# Patient Record
Sex: FEMALE | Race: WHITE | NOT HISPANIC OR LATINO
[De-identification: names, ages, dates, MRNs, and addresses within clinical notes are randomized per-mention and may not be internally consistent; named-entity substitution may affect disease eponyms.]

---

## 2019-06-12 ENCOUNTER — APPOINTMENT (OUTPATIENT)
Dept: ENDOCRINOLOGY | Facility: CLINIC | Age: 49
End: 2019-06-12
Payer: COMMERCIAL

## 2019-06-12 VITALS
HEART RATE: 88 BPM | RESPIRATION RATE: 17 BRPM | HEIGHT: 67 IN | WEIGHT: 241 LBS | BODY MASS INDEX: 37.83 KG/M2 | SYSTOLIC BLOOD PRESSURE: 142 MMHG | DIASTOLIC BLOOD PRESSURE: 90 MMHG | OXYGEN SATURATION: 95 %

## 2019-06-12 PROCEDURE — 99204 OFFICE O/P NEW MOD 45 MIN: CPT | Mod: 25

## 2019-06-12 PROCEDURE — 36415 COLL VENOUS BLD VENIPUNCTURE: CPT

## 2019-06-12 NOTE — HISTORY OF PRESENT ILLNESS
[FreeTextEntry1] : 48 year female  referred for hypothyroidism management. \par Ms. SHULTZ  was diagnosed with hypothyroidism at the age of 30. \par Previously under care of Dr. Alvarado.\par Most recently on  112 mcg of generic LT4. \par She gained about 70 lbs with her pregnancy about 6 years ago, was able to lose 30 lbs after delivery, and slowly regained another 20 lbs. She tries to eat 3 times a day, but is not watching calories for most of the time, frequently eating large carb/calorie dinner.\par She is very sedentary, putting typically no more than 1000 steps a day.\par She tried WW twice in her twenty's which helped at that time. \par Denies family history of thyroid cancer or history of radiation exposure to head and neck area in a childhood.\par No labs available for me to review.\par

## 2019-06-12 NOTE — CONSULT LETTER
[Dear  ___] : Dear  [unfilled], [Courtesy Letter:] : I had the pleasure of seeing your patient, [unfilled], in my office today. [Sincerely,] : Sincerely, [FreeTextEntry1] : Thank you for referring  Ms. THERON SHULTZ to me for evaluation and treatment. Please, see attached consultation note. As always, if there are specific questions you would like to discuss, please feel free to contact me.\par Thank you for the courtesy of this evaluation.\par  [FreeTextEntry3] : Eh Slaughter MD, FACE, ECNU\par

## 2019-06-12 NOTE — ASSESSMENT
[Levothyroxine] : The patient was instructed to take Levothyroxine on an empty stomach, separate from vitamins, and wait at least 30 minutes before eating [FreeTextEntry1] : - Current approaches to weight management are discussed with the patient. \par Suggested extensive nutritional education program. Proper dietary restrictions (including intermittent fasting and ketogenic diets and exercise routines discussed. Medical weight loss therapies were reviewed with the patient. \par Advised resuming weight watchers. Bariatric surgical options reviewed, but patient is not interested at this moment.\par - check CMP, a1c, thyroid panel\par - Thyroid US\par - Proper intake of levothyroxine is reviewed in details, including on an empty stomach, with water only, at least one hour before taking any medications, vitamins, or supplements and three-four hours before taking iron or calcium.\par RTC 2 weeks

## 2019-06-13 ENCOUNTER — TRANSCRIPTION ENCOUNTER (OUTPATIENT)
Age: 49
End: 2019-06-13

## 2019-06-13 LAB
ALBUMIN SERPL ELPH-MCNC: 4.3 G/DL
ALP BLD-CCNC: 56 U/L
ALT SERPL-CCNC: 18 U/L
ANION GAP SERPL CALC-SCNC: 10 MMOL/L
AST SERPL-CCNC: 20 U/L
BASOPHILS # BLD AUTO: 0.04 K/UL
BASOPHILS NFR BLD AUTO: 0.4 %
BILIRUB SERPL-MCNC: 0.3 MG/DL
BUN SERPL-MCNC: 15 MG/DL
CALCIUM SERPL-MCNC: 9.1 MG/DL
CHLORIDE SERPL-SCNC: 103 MMOL/L
CO2 SERPL-SCNC: 26 MMOL/L
CREAT SERPL-MCNC: 0.64 MG/DL
EOSINOPHIL # BLD AUTO: 0.3 K/UL
EOSINOPHIL NFR BLD AUTO: 3.3 %
ESTIMATED AVERAGE GLUCOSE: 117 MG/DL
GLUCOSE SERPL-MCNC: 123 MG/DL
HBA1C MFR BLD HPLC: 5.7 %
HCT VFR BLD CALC: 32.5 %
HGB BLD-MCNC: 9.4 G/DL
IMM GRANULOCYTES NFR BLD AUTO: 0.3 %
LYMPHOCYTES # BLD AUTO: 2.39 K/UL
LYMPHOCYTES NFR BLD AUTO: 26.3 %
MAN DIFF?: NORMAL
MCHC RBC-ENTMCNC: 23.9 PG
MCHC RBC-ENTMCNC: 28.9 GM/DL
MCV RBC AUTO: 82.5 FL
MONOCYTES # BLD AUTO: 0.68 K/UL
MONOCYTES NFR BLD AUTO: 7.5 %
NEUTROPHILS # BLD AUTO: 5.66 K/UL
NEUTROPHILS NFR BLD AUTO: 62.2 %
PLATELET # BLD AUTO: 496 K/UL
POTASSIUM SERPL-SCNC: 4 MMOL/L
PROT SERPL-MCNC: 7.4 G/DL
RBC # BLD: 3.94 M/UL
RBC # FLD: 17 %
SODIUM SERPL-SCNC: 139 MMOL/L
T4 FREE SERPL-MCNC: 1.3 NG/DL
THYROGLOB AB SERPL-ACNC: <20 IU/ML
THYROPEROXIDASE AB SERPL IA-ACNC: 171 IU/ML
TSH SERPL-ACNC: 1.6 UIU/ML
WBC # FLD AUTO: 9.1 K/UL

## 2019-07-09 ENCOUNTER — APPOINTMENT (OUTPATIENT)
Dept: ENDOCRINOLOGY | Facility: CLINIC | Age: 49
End: 2019-07-09
Payer: COMMERCIAL

## 2019-07-09 ENCOUNTER — APPOINTMENT (OUTPATIENT)
Dept: GASTROENTEROLOGY | Facility: CLINIC | Age: 49
End: 2019-07-09
Payer: COMMERCIAL

## 2019-07-09 VITALS
BODY MASS INDEX: 37.83 KG/M2 | DIASTOLIC BLOOD PRESSURE: 80 MMHG | HEIGHT: 67 IN | WEIGHT: 241 LBS | HEART RATE: 90 BPM | SYSTOLIC BLOOD PRESSURE: 126 MMHG | RESPIRATION RATE: 17 BRPM | OXYGEN SATURATION: 99 %

## 2019-07-09 VITALS
SYSTOLIC BLOOD PRESSURE: 120 MMHG | DIASTOLIC BLOOD PRESSURE: 70 MMHG | RESPIRATION RATE: 16 BRPM | TEMPERATURE: 98.2 F | BODY MASS INDEX: 37.83 KG/M2 | HEIGHT: 67 IN | WEIGHT: 241 LBS | OXYGEN SATURATION: 98 % | HEART RATE: 90 BPM

## 2019-07-09 DIAGNOSIS — Z83.71 FAMILY HISTORY OF COLONIC POLYPS: ICD-10-CM

## 2019-07-09 DIAGNOSIS — K92.1 MELENA: ICD-10-CM

## 2019-07-09 DIAGNOSIS — Z80.0 FAMILY HISTORY OF MALIGNANT NEOPLASM OF DIGESTIVE ORGANS: ICD-10-CM

## 2019-07-09 DIAGNOSIS — Z12.11 ENCOUNTER FOR SCREENING FOR MALIGNANT NEOPLASM OF COLON: ICD-10-CM

## 2019-07-09 PROCEDURE — 99205 OFFICE O/P NEW HI 60 MIN: CPT

## 2019-07-09 PROCEDURE — 99213 OFFICE O/P EST LOW 20 MIN: CPT

## 2019-07-09 NOTE — ASSESSMENT
[Levothyroxine] : The patient was instructed to take Levothyroxine on an empty stomach, separate from vitamins, and wait at least 30 minutes before eating [FreeTextEntry1] : - Current approaches to weight management are discussed with the patient. \par Suggested extensive nutritional education program. Proper dietary restrictions (including intermittent fasting and ketogenic diets and exercise routines discussed. Medical weight loss therapies were reviewed with the patient. \par Advised resuming weight watchers. Bariatric surgical options reviewed, but patient is not interested at this moment.\par - discussed retrying metformin, but patient wants to improve diet and exercises for now\par - Thyroid US\par - continue L-thyroxine 112mcg\par - Proper intake of levothyroxine is reviewed in details, including on an empty stomach, with water only, at least one hour before taking any medications, vitamins, or supplements and three-four hours before taking iron or calcium.\par - advised to f/u with gyn re: heavy bleeding, and if no change in anemia will see hem/onc\par RTC 3 months [Carbohydrate Consistent Diet] : carbohydrate consistent diet

## 2019-07-09 NOTE — HISTORY OF PRESENT ILLNESS
[FreeTextEntry1] : Office consultation on 19.\par \par Patient is a 48-year-old woman who presents for consultation with complaint of rectal bleeding. PMD: Dr. Lucila Ness.\par \par Patient has a long-standing history of intermittent hematochezia that she has attributed to hemorrhoids. Primarily describes staining the underwear and scant rectal bleeding when wiping. Never observed blood in her stool. Currently reports having one formed bowel movement daily without any complaints of diarrhea, constipation or change in bowel habit. As noted, does not observe blood in stool.\par \par Patient reports history of hemorrhoids initially during a pregnancy in . Delivered by  section on 10/11/12. Throughout pregnancy experienced anal irritation and observed intermittent spotting of red blood on her underwear. Ever since pregnancy patient has experienced intermittent anal irritation described as itching and discomfort. As noted, observed scant blood in underwear. Denies observing any blood dripping from the anal canal after defecation. Denies any chronic severe anal pain or any sense of tearing or cutting pain.\par \par Appetite is excellent. Reports progressive weight increase since pregnancy. Reports 10 pound weight increase over the past year. Denies any complaints of dysphagia, heartburn, nausea, vomiting or abdominal pain.\par \par Reports no past history of digestive illness except as noted. Patient never had a colonoscopy. Mother had colon polyps. Maternal grandfather had colon cancer in his 70s.

## 2019-07-09 NOTE — ASSESSMENT
[FreeTextEntry1] : Impression:\par \par #1 hematochezia-anal canal etiology suspected. Of note, scant perianal fecal soiling noted. Current symptoms of anal irritation and itching likely related to irritating effects from this. No significant hemorrhoids observed but possibility of internal hemorrhoids contributing to symptoms considered. Scant hematochezia likely related to irritation from excessive wiping. Evaluate for any alternate etiology of hematochezia including any possibility of colonic neoplastic process.\par \par #2 colon cancer screening-rule out colonic neoplasm. Reports mother had colonic polyps and maternal grandfather had colon cancer in his 70s. This may pose some increased risk of colorectal neoplasia.\par \par #3 obesity.\par \par #4 hypertension\par \par #5 pre-diabetes.\par \par #6 hypothyroid.\par \par #7 status post  section 10/2012.

## 2019-07-09 NOTE — PHYSICAL EXAM
[General Appearance - Alert] : alert [General Appearance - In No Acute Distress] : in no acute distress [General Appearance - Well Developed] : well developed [General Appearance - Well-Appearing] : healthy appearing [Sclera] : the sclera and conjunctiva were normal [Oropharynx] : the oropharynx was normal [Neck Appearance] : the appearance of the neck was normal [Neck Cervical Mass (___cm)] : no neck mass was observed [Respiration, Rhythm And Depth] : normal respiratory rhythm and effort [Exaggerated Use Of Accessory Muscles For Inspiration] : no accessory muscle use [Auscultation Breath Sounds / Voice Sounds] : lungs were clear to auscultation bilaterally [Heart Rate And Rhythm] : heart rate was normal and rhythm regular [Heart Sounds] : normal S1 and S2 [Heart Sounds Gallop] : no gallops [Murmurs] : no murmurs [Heart Sounds Pericardial Friction Rub] : no pericardial rub [Edema] : there was no peripheral edema [Bowel Sounds] : normal bowel sounds [Abdomen Soft] : soft [Abdomen Tenderness] : non-tender [] : no hepato-splenomegaly [Abdomen Mass (___ Cm)] : no abdominal mass palpated [Abdomen Hernia] : no hernia was discovered [Normal Sphincter Tone] : normal sphincter tone [No Rectal Mass] : no rectal mass [Cervical Lymph Nodes Enlarged Posterior Bilaterally] : posterior cervical [Supraclavicular Lymph Nodes Enlarged Bilaterally] : supraclavicular [Cervical Lymph Nodes Enlarged Anterior Bilaterally] : anterior cervical [No CVA Tenderness] : no ~M costovertebral angle tenderness [No Spinal Tenderness] : no spinal tenderness [Abnormal Walk] : normal gait [Nail Clubbing] : no clubbing  or cyanosis of the fingernails [Skin Color & Pigmentation] : normal skin color and pigmentation [Oriented To Time, Place, And Person] : oriented to person, place, and time [Impaired Insight] : insight and judgment were intact [Affect] : the affect was normal [Mood] : the mood was normal [FreeTextEntry1] : The anus is symmetric and normal appearing. There was no perianal tenderness, mass, fluctuance, drainage or fistulous opening. There was no evidence of a fissure. Significant prolapsing hemorrhoids were not observed. There was no rectal mass. Soft brown stool without blood was noted. Scant perianal soiling with residual fecal material was noted. Blood not observed.

## 2019-07-09 NOTE — HISTORY OF PRESENT ILLNESS
[FreeTextEntry1] : 48 year female  f/u for hypothyroidism management. \par TSH- 1.6, FT4- 1.3\par + TPO ab (171), neg tg ab\par did not do thyr US yet\par started diet 3 days ago. Lost 2 lbs so far.\par \par HPI:\par Ms. SHULTZ  was diagnosed with hypothyroidism at the age of 30. \par Previously under care of Dr. Alvarado.\par Most recently on  112 mcg of generic LT4. \par She gained about 70 lbs with her pregnancy about 6 years ago, was able to lose 30 lbs after delivery, and slowly regained another 20 lbs. She tries to eat 3 times a day, but is not watching calories for most of the time, frequently eating large carb/calorie dinner.\par She is very sedentary, putting typically no more than 1000 steps a day.\par She tried WW twice in her twenty's which helped at that time. \par Denies family history of thyroid cancer or history of radiation exposure to head and neck area in a childhood.\par

## 2019-07-09 NOTE — CONSULT LETTER
[Dear  ___] : Dear  [unfilled], [Consult Letter:] : I had the pleasure of evaluating your patient, [unfilled]. [( Thank you for referring [unfilled] for consultation for _____ )] : Thank you for referring [unfilled] for consultation for [unfilled] [Please see my note below.] : Please see my note below. [Consult Closing:] : Thank you very much for allowing me to participate in the care of this patient.  If you have any questions, please do not hesitate to contact me. [Sincerely,] : Sincerely, [FreeTextEntry2] : Dr. Lucila Ness [FreeTextEntry3] : Renan Prieto M.D.

## 2019-07-17 ENCOUNTER — OUTPATIENT (OUTPATIENT)
Dept: OUTPATIENT SERVICES | Facility: HOSPITAL | Age: 49
LOS: 1 days | Discharge: HOME | End: 2019-07-17

## 2019-07-17 DIAGNOSIS — D50.9 IRON DEFICIENCY ANEMIA, UNSPECIFIED: ICD-10-CM

## 2019-07-29 ENCOUNTER — APPOINTMENT (OUTPATIENT)
Dept: HEMATOLOGY ONCOLOGY | Facility: CLINIC | Age: 49
End: 2019-07-29
Payer: COMMERCIAL

## 2019-07-29 VITALS
HEIGHT: 66.93 IN | HEART RATE: 78 BPM | RESPIRATION RATE: 17 BRPM | SYSTOLIC BLOOD PRESSURE: 131 MMHG | WEIGHT: 243.39 LBS | BODY MASS INDEX: 38.2 KG/M2 | OXYGEN SATURATION: 99 % | TEMPERATURE: 98.5 F | DIASTOLIC BLOOD PRESSURE: 85 MMHG

## 2019-07-29 PROCEDURE — 99204 OFFICE O/P NEW MOD 45 MIN: CPT

## 2019-07-30 ENCOUNTER — APPOINTMENT (OUTPATIENT)
Dept: FAMILY MEDICINE | Facility: CLINIC | Age: 49
End: 2019-07-30
Payer: COMMERCIAL

## 2019-07-30 VITALS
WEIGHT: 243 LBS | BODY MASS INDEX: 38.14 KG/M2 | DIASTOLIC BLOOD PRESSURE: 90 MMHG | RESPIRATION RATE: 17 BRPM | HEIGHT: 66.93 IN | HEART RATE: 81 BPM | OXYGEN SATURATION: 100 % | SYSTOLIC BLOOD PRESSURE: 130 MMHG

## 2019-07-30 PROCEDURE — 99386 PREV VISIT NEW AGE 40-64: CPT | Mod: 25

## 2019-07-30 PROCEDURE — 36415 COLL VENOUS BLD VENIPUNCTURE: CPT

## 2019-07-30 NOTE — REASON FOR VISIT
[Initial Consultation] : an initial consultation for [Blood Count Assessment] : blood count assessment [FreeTextEntry2] : anemia

## 2019-07-30 NOTE — ASSESSMENT
[FreeTextEntry1] : This is a 48 year old woman with a history of iron deficiency secondary to menorrhagia, irregular heavy menstrual periods. She had seen GI who did not believe there was much evidence of GI bleeding.  Patient had been on PO Iron in the post not recently, did not have much GI  side effects from it, it was simply a matter of taking it. Recommended she take the supplements again, and follow up in 3 months. If CBC significantly low, or not responsive to the PO iron we could always start the parental iron treatment later.  Gave her tips on how to improve absorbing and decrease GI toxicity such as taking with vitamin C or an acidic drink.  \par \par Patient seeing PCP tomorrow, did not want to get venipuncture twice.  Gave her a prescription for the anemia workup, check ARNIE, ESR  r/o inflammatory disease, check Ferritin to evaluate iron deficiency, check Haptoglobin, LDH and retic count r/o hemolysis.  Check B12 and folic acid for accompanying nutritional deficiencies.  \par \par Spent > 45 minutes in direct patient care and and addressed all questions and concerns.  >50% of this time was in direct face to face contact with patient during exam and counseling.

## 2019-07-30 NOTE — HISTORY OF PRESENT ILLNESS
[de-identified] : This is a 48 year old woman with a history of iron deficiency anemia, hx of GI bleeding. She is following with Dr. Renan Prieto.  \par Just started iron tablet daily but was inquiring about the status of the anemia.  Was on iron at one point and stopped.  \par Was at GYN about a year ago.  Periods changed, now sporadic, has been very heavy occasionally.  \par Her gastroenterologist did not find any source of bleeding, but there is some rectal irritation.  Patient going for colonoscopy.  \par Patient has not been eating much protein.  Mostly carbs.   Was avoiding meat for a while however more recently corrected this.

## 2019-07-30 NOTE — REVIEW OF SYSTEMS
[Fatigue] : fatigue [Negative] : Psychiatric [Shortness Of Breath] : no shortness of breath [FreeTextEntry2] : tired [FreeTextEntry8] : jamal.

## 2019-10-07 ENCOUNTER — TRANSCRIPTION ENCOUNTER (OUTPATIENT)
Age: 49
End: 2019-10-07

## 2019-10-21 ENCOUNTER — OUTPATIENT (OUTPATIENT)
Dept: OUTPATIENT SERVICES | Facility: HOSPITAL | Age: 49
LOS: 1 days | Discharge: HOME | End: 2019-10-21

## 2019-10-21 DIAGNOSIS — D50.9 IRON DEFICIENCY ANEMIA, UNSPECIFIED: ICD-10-CM

## 2019-10-30 ENCOUNTER — APPOINTMENT (OUTPATIENT)
Dept: HEMATOLOGY ONCOLOGY | Facility: CLINIC | Age: 49
End: 2019-10-30

## 2019-11-08 ENCOUNTER — TRANSCRIPTION ENCOUNTER (OUTPATIENT)
Age: 49
End: 2019-11-08

## 2019-11-12 ENCOUNTER — RX RENEWAL (OUTPATIENT)
Age: 49
End: 2019-11-12

## 2019-11-12 ENCOUNTER — APPOINTMENT (OUTPATIENT)
Dept: ENDOCRINOLOGY | Facility: CLINIC | Age: 49
End: 2019-11-12
Payer: COMMERCIAL

## 2019-11-12 VITALS
HEIGHT: 66.93 IN | BODY MASS INDEX: 36.1 KG/M2 | HEART RATE: 79 BPM | WEIGHT: 230 LBS | RESPIRATION RATE: 17 BRPM | DIASTOLIC BLOOD PRESSURE: 90 MMHG | OXYGEN SATURATION: 98 % | SYSTOLIC BLOOD PRESSURE: 140 MMHG

## 2019-11-12 LAB
25(OH)D3 SERPL-MCNC: 18.7 NG/ML
ALBUMIN SERPL ELPH-MCNC: 4.3 G/DL
ALP BLD-CCNC: 57 U/L
ALT SERPL-CCNC: 12 U/L
ANION GAP SERPL CALC-SCNC: 14 MMOL/L
AST SERPL-CCNC: 12 U/L
BASOPHILS # BLD AUTO: 0.05 K/UL
BASOPHILS NFR BLD AUTO: 0.5 %
BILIRUB SERPL-MCNC: 0.4 MG/DL
BUN SERPL-MCNC: 11 MG/DL
CALCIUM SERPL-MCNC: 9.2 MG/DL
CHLORIDE SERPL-SCNC: 103 MMOL/L
CHOLEST SERPL-MCNC: 221 MG/DL
CHOLEST/HDLC SERPL: 5.4 RATIO
CO2 SERPL-SCNC: 24 MMOL/L
CREAT SERPL-MCNC: 0.69 MG/DL
EOSINOPHIL # BLD AUTO: 0.23 K/UL
EOSINOPHIL NFR BLD AUTO: 2.5 %
ESTIMATED AVERAGE GLUCOSE: 117 MG/DL
GLUCOSE SERPL-MCNC: 93 MG/DL
HBA1C MFR BLD HPLC: 5.7 %
HCT VFR BLD CALC: 33.5 %
HDLC SERPL-MCNC: 41 MG/DL
HGB BLD-MCNC: 9.9 G/DL
IMM GRANULOCYTES NFR BLD AUTO: 0.3 %
IRON SATN MFR SERPL: 5 %
IRON SERPL-MCNC: 22 UG/DL
LDLC SERPL CALC-MCNC: 128 MG/DL
LYMPHOCYTES # BLD AUTO: 2.36 K/UL
LYMPHOCYTES NFR BLD AUTO: 25.5 %
MAN DIFF?: NORMAL
MCHC RBC-ENTMCNC: 24.3 PG
MCHC RBC-ENTMCNC: 29.6 GM/DL
MCV RBC AUTO: 82.1 FL
MONOCYTES # BLD AUTO: 0.55 K/UL
MONOCYTES NFR BLD AUTO: 5.9 %
NEUTROPHILS # BLD AUTO: 6.03 K/UL
NEUTROPHILS NFR BLD AUTO: 65.3 %
PLATELET # BLD AUTO: 500 K/UL
POTASSIUM SERPL-SCNC: 4.5 MMOL/L
PROT SERPL-MCNC: 7.3 G/DL
RBC # BLD: 4.08 M/UL
RBC # FLD: 18.1 %
SODIUM SERPL-SCNC: 141 MMOL/L
T4 FREE SERPL-MCNC: 1.3 NG/DL
TIBC SERPL-MCNC: 425 UG/DL
TRIGL SERPL-MCNC: 262 MG/DL
TSH SERPL-ACNC: 1.73 UIU/ML
UIBC SERPL-MCNC: 403 UG/DL
VIT B12 SERPL-MCNC: 443 PG/ML
WBC # FLD AUTO: 9.25 K/UL

## 2019-11-12 PROCEDURE — 36415 COLL VENOUS BLD VENIPUNCTURE: CPT

## 2019-11-12 PROCEDURE — 99214 OFFICE O/P EST MOD 30 MIN: CPT | Mod: 25

## 2019-11-13 ENCOUNTER — TRANSCRIPTION ENCOUNTER (OUTPATIENT)
Age: 49
End: 2019-11-13

## 2019-11-13 LAB
THYROGLOB AB SERPL-ACNC: <20 IU/ML
THYROPEROXIDASE AB SERPL IA-ACNC: 140 IU/ML

## 2019-11-13 NOTE — ADDENDUM
[FreeTextEntry1] : *** Nov 13, 2019 ***\par \par per patient- she's actually taking synthroid 137 mcg

## 2019-11-13 NOTE — ASSESSMENT
[Carbohydrate Consistent Diet] : carbohydrate consistent diet [Levothyroxine] : The patient was instructed to take Levothyroxine on an empty stomach, separate from vitamins, and wait at least 30 minutes before eating [FreeTextEntry1] : - Current approaches to weight management are discussed with the patient. \par - various diets reviewed again., will cont on present since there's an improvement\par - discussed retrying metformin, but patient wants to improve diet and exercises for now\par - Thyroid US\par - continue L-thyroxine 112mcg\par - Proper intake of levothyroxine is reviewed in details, including on an empty stomach, with water only, at least one hour before taking any medications, vitamins, or supplements and three-four hours before taking iron or calcium.\par - f/u  hem/onc\par RTC 3 months

## 2019-11-13 NOTE — HISTORY OF PRESENT ILLNESS
[FreeTextEntry1] : 49 year female  f/u for hypothyroidism management.\par \par *** Nov 12, 2019 ***\par no thyr US yet. no rpt labs \par feels ok overall. lost 13 lbs on the new diet ("whole 30"- low CHO). seeing Dr. Concepcion (hem-onc)\par on L-thyroxine 112mcg\par \par *** July 09, 2019 ***\par  \par TSH- 1.6, FT4- 1.3\par + TPO ab (171), neg tg ab\par did not do thyr US yet\par started diet 3 days ago. Lost 2 lbs so far.\par \par HPI:\par Ms. SHULTZ  was diagnosed with hypothyroidism at the age of 30. \par Previously under care of Dr. Alvarado.\par Most recently on  112 mcg of generic LT4. \par She gained about 70 lbs with her pregnancy about 6 years ago, was able to lose 30 lbs after delivery, and slowly regained another 20 lbs. She tries to eat 3 times a day, but is not watching calories for most of the time, frequently eating large carb/calorie dinner.\par She is very sedentary, putting typically no more than 1000 steps a day.\par She tried WW twice in her twenty's which helped at that time. \par Denies family history of thyroid cancer or history of radiation exposure to head and neck area in a childhood.\par

## 2020-01-23 ENCOUNTER — APPOINTMENT (OUTPATIENT)
Dept: GASTROENTEROLOGY | Facility: HOSPITAL | Age: 50
End: 2020-01-23

## 2020-02-10 ENCOUNTER — TRANSCRIPTION ENCOUNTER (OUTPATIENT)
Age: 50
End: 2020-02-10

## 2020-02-11 ENCOUNTER — TRANSCRIPTION ENCOUNTER (OUTPATIENT)
Age: 50
End: 2020-02-11

## 2020-02-27 ENCOUNTER — APPOINTMENT (OUTPATIENT)
Dept: FAMILY MEDICINE | Facility: CLINIC | Age: 50
End: 2020-02-27
Payer: COMMERCIAL

## 2020-02-27 VITALS
SYSTOLIC BLOOD PRESSURE: 110 MMHG | DIASTOLIC BLOOD PRESSURE: 70 MMHG | WEIGHT: 230 LBS | BODY MASS INDEX: 36.1 KG/M2 | HEIGHT: 66.93 IN

## 2020-02-27 PROCEDURE — 99214 OFFICE O/P EST MOD 30 MIN: CPT

## 2020-03-31 ENCOUNTER — TRANSCRIPTION ENCOUNTER (OUTPATIENT)
Age: 50
End: 2020-03-31

## 2020-04-02 ENCOUNTER — APPOINTMENT (OUTPATIENT)
Dept: ENDOCRINOLOGY | Facility: CLINIC | Age: 50
End: 2020-04-02
Payer: COMMERCIAL

## 2020-04-02 PROCEDURE — 99214 OFFICE O/P EST MOD 30 MIN: CPT

## 2020-04-02 NOTE — HISTORY OF PRESENT ILLNESS
[Home] : at home, [unfilled] , at the time of the visit. [Medical Office: ([facility])___] : at ~his/her~ medical office located in [place] [Patient] : the patient [Self] : self [FreeTextEntry1] : 49 year female  f/u for hypothyroidism management.\par \par *** Televisit  Apr 02, 2020 ***\par \par on synthroid 137 mcg, drisdol 50K qw\par no recent labs\par weight is stable, reports 222 lbs\par \par *** Nov 12, 2019 ***\par no thyr US yet. no rpt labs \par feels ok overall. lost 13 lbs on the new diet ("whole 30"- low CHO). seeing Dr. Concepcion (hem-onc)\par on L-thyroxine 137mcg\par \par *** July 09, 2019 ***\par  \par TSH- 1.6, FT4- 1.3\par + TPO ab (171), neg tg ab\par did not do thyr US yet\par started diet 3 days ago. Lost 2 lbs so far.\par \par HPI:\par Ms. SHULTZ  was diagnosed with hypothyroidism at the age of 30. \par Previously under care of Dr. Alvarado.\par Most recently on  112 mcg of generic LT4. \par She gained about 70 lbs with her pregnancy about 6 years ago, was able to lose 30 lbs after delivery, and slowly regained another 20 lbs. She tries to eat 3 times a day, but is not watching calories for most of the time, frequently eating large carb/calorie dinner.\par She is very sedentary, putting typically no more than 1000 steps a day.\par She tried WW twice in her twenty's which helped at that time. \par Denies family history of thyroid cancer or history of radiation exposure to head and neck area in a childhood.\par

## 2020-04-02 NOTE — ASSESSMENT
[Carbohydrate Consistent Diet] : carbohydrate consistent diet [Levothyroxine] : The patient was instructed to take Levothyroxine on an empty stomach, separate from vitamins, and wait at least 30 minutes before eating [FreeTextEntry1] : - Current approaches to weight management are discussed with the patient. \par - various diets reviewed again., will cont on present since there's an improvement\par -  we discussed retrying metformin, but patient wants to improve diet and exercises for now\par - Thyroid US when covid situation improves\par - continue L-thyroxine 137mcg, drisdol 50K qw, pending labs\par - Proper intake of levothyroxine is reviewed in details, including on an empty stomach, with water only, at least one hour before taking any medications, vitamins, or supplements and three-four hours before taking iron or calcium.\par - f/u  hem/onc\par RTC 3 months

## 2020-05-11 ENCOUNTER — TRANSCRIPTION ENCOUNTER (OUTPATIENT)
Age: 50
End: 2020-05-11

## 2020-07-16 ENCOUNTER — TRANSCRIPTION ENCOUNTER (OUTPATIENT)
Age: 50
End: 2020-07-16

## 2020-07-16 LAB
25(OH)D3 SERPL-MCNC: 28.5 NG/ML
ALBUMIN SERPL ELPH-MCNC: 4.4 G/DL
ALP BLD-CCNC: 47 U/L
ALT SERPL-CCNC: 16 U/L
ANION GAP SERPL CALC-SCNC: 15 MMOL/L
AST SERPL-CCNC: 16 U/L
BILIRUB SERPL-MCNC: 0.4 MG/DL
BUN SERPL-MCNC: 12 MG/DL
CALCIUM SERPL-MCNC: 9.2 MG/DL
CHLORIDE SERPL-SCNC: 99 MMOL/L
CHOLEST SERPL-MCNC: 244 MG/DL
CHOLEST/HDLC SERPL: 5.5 RATIO
CO2 SERPL-SCNC: 24 MMOL/L
CREAT SERPL-MCNC: 0.78 MG/DL
ESTIMATED AVERAGE GLUCOSE: 123 MG/DL
FRUCTOSAMINE SERPL-MCNC: 226 UMOL/L
GLUCOSE SERPL-MCNC: 96 MG/DL
HBA1C MFR BLD HPLC: 5.9 %
HDLC SERPL-MCNC: 44 MG/DL
LDLC SERPL CALC-MCNC: 127 MG/DL
POTASSIUM SERPL-SCNC: 4.7 MMOL/L
PROT SERPL-MCNC: 7.3 G/DL
SODIUM SERPL-SCNC: 138 MMOL/L
T4 FREE SERPL-MCNC: 1.1 NG/DL
TRIGL SERPL-MCNC: 362 MG/DL
TSH SERPL-ACNC: 4.4 UIU/ML

## 2020-07-24 ENCOUNTER — APPOINTMENT (OUTPATIENT)
Dept: ENDOCRINOLOGY | Facility: CLINIC | Age: 50
End: 2020-07-24
Payer: COMMERCIAL

## 2020-07-24 VITALS
WEIGHT: 235 LBS | RESPIRATION RATE: 17 BRPM | OXYGEN SATURATION: 98 % | TEMPERATURE: 97.6 F | HEART RATE: 78 BPM | SYSTOLIC BLOOD PRESSURE: 136 MMHG | BODY MASS INDEX: 36.88 KG/M2 | HEIGHT: 66.93 IN | DIASTOLIC BLOOD PRESSURE: 67 MMHG

## 2020-07-24 PROCEDURE — 99214 OFFICE O/P EST MOD 30 MIN: CPT

## 2020-07-24 RX ORDER — SODIUM PICOSULFATE, MAGNESIUM OXIDE, AND ANHYDROUS CITRIC ACID 10; 3.5; 12 MG/160ML; G/160ML; G/160ML
10-3.5-12 MG-GM LIQUID ORAL
Qty: 1 | Refills: 0 | Status: DISCONTINUED | COMMUNITY
Start: 2019-07-09 | End: 2020-07-24

## 2020-07-24 NOTE — ASSESSMENT
[Carbohydrate Consistent Diet] : carbohydrate consistent diet [Levothyroxine] : The patient was instructed to take Levothyroxine on an empty stomach, separate from vitamins, and wait at least 30 minutes before eating [FreeTextEntry1] : - Current approaches to weight management are discussed with the patient. \par - various diets reviewed again., will cont on present since there's an improvement\par -  we discussed retrying metformin, but patient wants to improve diet and exercises for now\par - Thyroid US \par - resume L-thyroxine 137mcg daily and drisdol 50K qw\par - compliance is reiterated\par - Proper intake of levothyroxine is reviewed in details, including on an empty stomach, with water only, at least one hour before taking any medications, vitamins, or supplements and three-four hours before taking iron or calcium.\par - f/u  hem/onc\par - we discussed starting lipid therapy- pt is reluctant at present. add fish oil, to see our RD\par RTC 3 months

## 2020-07-24 NOTE — HISTORY OF PRESENT ILLNESS
[FreeTextEntry1] : 49 year female  f/u for hypothyroidism management.\par \par *** Jul 24, 2020 ***\par \par skips synthroid on average 2-3 times a week\par diet is worse. still on "whole 30 diet"\par on synthroid 137 mcg, drisdol 50K qw\par TSH- 4.4\par TG- 362, TC- 244, LDL- 127\par a1c- 5.9\par \par *** Televisit  Apr 02, 2020 ***\par \par on synthroid 137 mcg, drisdol 50K qw\par no recent labs\par weight is stable, reports 222 lbs\par \par *** Nov 12, 2019 ***\par no thyr US yet. no rpt labs \par feels ok overall. lost 13 lbs on the new diet ("whole 30"- low CHO). seeing Dr. Concepcion (hem-onc)\par on L-thyroxine 137mcg\par \par *** July 09, 2019 ***\par  \par TSH- 1.6, FT4- 1.3\par + TPO ab (171), neg tg ab\par did not do thyr US yet\par started diet 3 days ago. Lost 2 lbs so far.\par \par HPI:\par Ms. SHULTZ  was diagnosed with hypothyroidism at the age of 30. \par Previously under care of Dr. Alvarado.\par Most recently on  112 mcg of generic LT4. \par She gained about 70 lbs with her pregnancy about 6 years ago, was able to lose 30 lbs after delivery, and slowly regained another 20 lbs. She tries to eat 3 times a day, but is not watching calories for most of the time, frequently eating large carb/calorie dinner.\par She is very sedentary, putting typically no more than 1000 steps a day.\par She tried WW twice in her twenty's which helped at that time. \par Denies family history of thyroid cancer or history of radiation exposure to head and neck area in a childhood.\par

## 2020-07-26 ENCOUNTER — TRANSCRIPTION ENCOUNTER (OUTPATIENT)
Age: 50
End: 2020-07-26

## 2020-07-29 ENCOUNTER — APPOINTMENT (OUTPATIENT)
Dept: ENDOCRINOLOGY | Facility: CLINIC | Age: 50
End: 2020-07-29
Payer: COMMERCIAL

## 2020-07-29 VITALS — BODY MASS INDEX: 37.88 KG/M2 | WEIGHT: 241.31 LBS

## 2020-07-29 PROCEDURE — 97802 MEDICAL NUTRITION INDIV IN: CPT

## 2020-08-03 ENCOUNTER — TRANSCRIPTION ENCOUNTER (OUTPATIENT)
Age: 50
End: 2020-08-03

## 2020-09-30 ENCOUNTER — APPOINTMENT (OUTPATIENT)
Dept: ULTRASOUND IMAGING | Facility: HOSPITAL | Age: 50
End: 2020-09-30

## 2020-11-17 ENCOUNTER — TRANSCRIPTION ENCOUNTER (OUTPATIENT)
Age: 50
End: 2020-11-17

## 2020-12-07 ENCOUNTER — APPOINTMENT (OUTPATIENT)
Dept: FAMILY MEDICINE | Facility: CLINIC | Age: 50
End: 2020-12-07
Payer: COMMERCIAL

## 2020-12-07 VITALS
BODY MASS INDEX: 38.45 KG/M2 | OXYGEN SATURATION: 99 % | SYSTOLIC BLOOD PRESSURE: 146 MMHG | DIASTOLIC BLOOD PRESSURE: 96 MMHG | HEART RATE: 90 BPM | WEIGHT: 245 LBS

## 2020-12-07 DIAGNOSIS — Z23 ENCOUNTER FOR IMMUNIZATION: ICD-10-CM

## 2020-12-07 PROCEDURE — 36415 COLL VENOUS BLD VENIPUNCTURE: CPT

## 2020-12-07 PROCEDURE — G0008: CPT

## 2020-12-07 PROCEDURE — 99396 PREV VISIT EST AGE 40-64: CPT | Mod: 25

## 2020-12-07 PROCEDURE — 99072 ADDL SUPL MATRL&STAF TM PHE: CPT

## 2020-12-07 PROCEDURE — 90686 IIV4 VACC NO PRSV 0.5 ML IM: CPT

## 2020-12-07 RX ORDER — NORETHINDRONE 0.35 MG/1
0.35 TABLET ORAL
Qty: 84 | Refills: 0 | Status: ACTIVE | COMMUNITY
Start: 2020-11-19

## 2020-12-07 NOTE — HISTORY OF PRESENT ILLNESS
[de-identified] : 50 year old female  here for annual well visit. Patient's blood work was drawn and medications reviewed. Patient's past medical history was reviewed, allergies verified and problems were identified and assessed. Patients medications were reviewed. Patient is feeling well with no new or active complaints at this time.\par

## 2020-12-07 NOTE — HEALTH RISK ASSESSMENT
[Excellent] : ~his/her~  mood as  excellent [] : No [No] : No [0] : 2) Feeling down, depressed, or hopeless: Not at all (0) [[id number]] : 0 [Patient reported mammogram was normal] : Patient reported mammogram was normal [With Significant Other] : lives with significant other [Unemployed] : unemployed [] :  [# Of Children ___] : has [unfilled] children [MammogramDate] : 6/2020

## 2020-12-07 NOTE — ASSESSMENT
[FreeTextEntry1] : Patient was counseled on healthy eating habits, on daily exercise and stress relief. All medications and allergies were reviewed with the patient and any adjustments necessary were made. Patient was counseled to try engage in an exercise activity for at least 30 minutes 3-4 times a week.  Patient was advised to eat a diet low in fat and carbohydrates and high in protein, with plenty of vegetables. Patient was advised to try and engage in relaxing activities whenever possible.\par The patients blood was draw in office and will be followed and assessed for any issues.  Patient was told to return to the office if any issues arise.  Unless otherwise stated, the patient is to continue all other medications as previously prescribed.\par \par hypertension\par The patient has a diagnosis of hypertension. Blood work was drawn in office and will be followed.  The diagnosis was discussed with patient and need for medication compliance and possible side affects and risks of noncompliance. Patient was told to adhere to a low salt diet and try to incorporate exercise daily.\par elevated today, increase toprol to 50 and reassess\par \par thyroid\par Patient had a diagnosis of thyroid disorder. Blood was drawn to assess levels of TSH and T4. Patient will continue on current dose of medications at this time unless instructed otherwise. Patient was advised to take thyroid medications on a empty stomach and to wait at least 45 minutes before eating for appropriate absorption.\par fu with e ndo\par \par flu shot\par Patient was given a vaccine and was counseled regarding all side affects. Patient was advised to ice the area if necessary if it is tender or red. Patient was told to return to office if has any fever, nausea, vomiting or increased pain.\par \par

## 2020-12-08 LAB
25(OH)D3 SERPL-MCNC: 30.8 NG/ML
ALBUMIN SERPL ELPH-MCNC: 4.3 G/DL
ALP BLD-CCNC: 51 U/L
ALT SERPL-CCNC: 13 U/L
ANION GAP SERPL CALC-SCNC: 11 MMOL/L
AST SERPL-CCNC: 14 U/L
BILIRUB SERPL-MCNC: 0.3 MG/DL
BUN SERPL-MCNC: 13 MG/DL
CALCIUM SERPL-MCNC: 8.9 MG/DL
CHLORIDE SERPL-SCNC: 104 MMOL/L
CHOLEST SERPL-MCNC: 237 MG/DL
CO2 SERPL-SCNC: 26 MMOL/L
CREAT SERPL-MCNC: 0.86 MG/DL
CREAT SPEC-SCNC: 266 MG/DL
ESTIMATED AVERAGE GLUCOSE: 123 MG/DL
FOLATE SERPL-MCNC: 12.9 NG/ML
FRUCTOSAMINE SERPL-MCNC: 213 UMOL/L
GLUCOSE SERPL-MCNC: 90 MG/DL
HBA1C MFR BLD HPLC: 5.9 %
HDLC SERPL-MCNC: 43 MG/DL
LDLC SERPL CALC-MCNC: 128 MG/DL
MICROALBUMIN 24H UR DL<=1MG/L-MCNC: 16.4 MG/DL
MICROALBUMIN/CREAT 24H UR-RTO: 62 MG/G
NONHDLC SERPL-MCNC: 194 MG/DL
POTASSIUM SERPL-SCNC: 4.8 MMOL/L
PROT SERPL-MCNC: 7.2 G/DL
SODIUM SERPL-SCNC: 141 MMOL/L
T4 FREE SERPL-MCNC: 1.1 NG/DL
TRIGL SERPL-MCNC: 331 MG/DL
TSH SERPL-ACNC: 3.07 UIU/ML
VIT B12 SERPL-MCNC: 322 PG/ML

## 2020-12-15 ENCOUNTER — APPOINTMENT (OUTPATIENT)
Dept: ULTRASOUND IMAGING | Facility: HOSPITAL | Age: 50
End: 2020-12-15
Payer: COMMERCIAL

## 2020-12-15 ENCOUNTER — OUTPATIENT (OUTPATIENT)
Dept: OUTPATIENT SERVICES | Facility: HOSPITAL | Age: 50
LOS: 1 days | End: 2020-12-15
Payer: COMMERCIAL

## 2020-12-15 DIAGNOSIS — E55.9 VITAMIN D DEFICIENCY, UNSPECIFIED: ICD-10-CM

## 2020-12-15 DIAGNOSIS — E03.9 HYPOTHYROIDISM, UNSPECIFIED: ICD-10-CM

## 2020-12-15 DIAGNOSIS — R73.03 PREDIABETES: ICD-10-CM

## 2020-12-15 PROCEDURE — 76536 US EXAM OF HEAD AND NECK: CPT

## 2020-12-15 PROCEDURE — 76536 US EXAM OF HEAD AND NECK: CPT | Mod: 26

## 2020-12-16 ENCOUNTER — TRANSCRIPTION ENCOUNTER (OUTPATIENT)
Age: 50
End: 2020-12-16

## 2020-12-17 ENCOUNTER — TRANSCRIPTION ENCOUNTER (OUTPATIENT)
Age: 50
End: 2020-12-17

## 2020-12-21 PROBLEM — Z12.11 ENCOUNTER FOR SCREENING COLONOSCOPY: Status: RESOLVED | Noted: 2019-07-09 | Resolved: 2020-12-21

## 2021-02-24 ENCOUNTER — NON-APPOINTMENT (OUTPATIENT)
Age: 51
End: 2021-02-24

## 2021-03-17 ENCOUNTER — APPOINTMENT (OUTPATIENT)
Dept: ENDOCRINOLOGY | Facility: CLINIC | Age: 51
End: 2021-03-17
Payer: COMMERCIAL

## 2021-03-17 VITALS
RESPIRATION RATE: 17 BRPM | BODY MASS INDEX: 38.45 KG/M2 | WEIGHT: 245 LBS | DIASTOLIC BLOOD PRESSURE: 100 MMHG | SYSTOLIC BLOOD PRESSURE: 155 MMHG | HEIGHT: 66.93 IN | HEART RATE: 165 BPM | TEMPERATURE: 98.1 F | OXYGEN SATURATION: 98 %

## 2021-03-17 PROCEDURE — 99072 ADDL SUPL MATRL&STAF TM PHE: CPT

## 2021-03-17 PROCEDURE — 36415 COLL VENOUS BLD VENIPUNCTURE: CPT

## 2021-03-17 PROCEDURE — 99214 OFFICE O/P EST MOD 30 MIN: CPT | Mod: 25

## 2021-03-17 NOTE — HISTORY OF PRESENT ILLNESS
[FreeTextEntry1] : 50 year female  f/u for hypothyroidism management.\par \par *** Mar 17, 2021 ***\par \par more consistent with Synthroid intake, but takes ta 7 pm along with other pills and food\par taking 137 mcg\par Thyr US (12/15/20)-  no nodules\par \par *** Jul 24, 2020 ***\par \par skips synthroid on average 2-3 times a week\par diet is worse. still on "whole 30 diet"\par on synthroid 137 mcg, drisdol 50K qw\par TSH- 4.4\par TG- 362, TC- 244, LDL- 127\par a1c- 5.9\par \par *** Televisit  Apr 02, 2020 ***\par \par on synthroid 137 mcg, drisdol 50K qw\par no recent labs\par weight is stable, reports 222 lbs\par \par *** Nov 12, 2019 ***\par no thyr US yet. no rpt labs \par feels ok overall. lost 13 lbs on the new diet ("whole 30"- low CHO). seeing Dr. Concepcion (hem-onc)\par on L-thyroxine 137mcg\par \par *** July 09, 2019 ***\par  \par TSH- 1.6, FT4- 1.3\par + TPO ab (171), neg tg ab\par did not do thyr US yet\par started diet 3 days ago. Lost 2 lbs so far.\par \par HPI:\par Ms. SHULTZ  was diagnosed with hypothyroidism at the age of 30. \par Previously under care of Dr. Alvarado.\par Most recently on  112 mcg of generic LT4. \par She gained about 70 lbs with her pregnancy about 6 years ago, was able to lose 30 lbs after delivery, and slowly regained another 20 lbs. She tries to eat 3 times a day, but is not watching calories for most of the time, frequently eating large carb/calorie dinner.\par She is very sedentary, putting typically no more than 1000 steps a day.\par She tried WW twice in her twenty's which helped at that time. \par Denies family history of thyroid cancer or history of radiation exposure to head and neck area in a childhood.\par

## 2021-03-17 NOTE — ASSESSMENT
[Carbohydrate Consistent Diet] : carbohydrate consistent diet [Levothyroxine] : The patient was instructed to take Levothyroxine on an empty stomach, separate from vitamins, and wait at least 30 minutes before eating [FreeTextEntry1] : - Current approaches to weight management are discussed with the patient. \par - various diets reviewed again., will cont on present since there's an improvement\par -  we discussed retrying metformin, but patient wants to improve diet and exercises for now\par - cont L-thyroxine 137mcg daily, pending labs. Switch  intake to am , fasting\par - compliance is reiterated\par  and drisdol 50K qw\par - Proper intake of levothyroxine is reviewed in details, including on an empty stomach, with water only, at least one hour before taking any medications, vitamins, or supplements and three-four hours before taking iron or calcium.\par - f/u  hem/onc\par - we discussed starting lipid therapy- pt is reluctant at present. add fish oil, to see our RD\par RTC 3 months

## 2021-03-18 ENCOUNTER — TRANSCRIPTION ENCOUNTER (OUTPATIENT)
Age: 51
End: 2021-03-18

## 2021-03-18 LAB
25(OH)D3 SERPL-MCNC: 34.3 NG/ML
ESTIMATED AVERAGE GLUCOSE: 126 MG/DL
HBA1C MFR BLD HPLC: 6 %
T4 FREE SERPL-MCNC: 1.1 NG/DL
TSH SERPL-ACNC: 3.72 UIU/ML

## 2021-05-11 ENCOUNTER — TRANSCRIPTION ENCOUNTER (OUTPATIENT)
Age: 51
End: 2021-05-11

## 2021-05-11 RX ORDER — ERGOCALCIFEROL 1.25 MG/1
1.25 MG CAPSULE ORAL
Qty: 13 | Refills: 2 | Status: ACTIVE | COMMUNITY
Start: 2019-11-12 | End: 1900-01-01

## 2021-06-11 ENCOUNTER — TRANSCRIPTION ENCOUNTER (OUTPATIENT)
Age: 51
End: 2021-06-11

## 2021-07-06 ENCOUNTER — NON-APPOINTMENT (OUTPATIENT)
Age: 51
End: 2021-07-06

## 2021-07-06 ENCOUNTER — APPOINTMENT (OUTPATIENT)
Dept: INTERNAL MEDICINE | Facility: CLINIC | Age: 51
End: 2021-07-06
Payer: COMMERCIAL

## 2021-07-06 VITALS — WEIGHT: 252 LBS | BODY MASS INDEX: 39.55 KG/M2

## 2021-07-06 VITALS — HEIGHT: 66.93 IN | BODY MASS INDEX: 39.55 KG/M2

## 2021-07-06 VITALS — DIASTOLIC BLOOD PRESSURE: 80 MMHG | SYSTOLIC BLOOD PRESSURE: 110 MMHG

## 2021-07-06 VITALS — BODY MASS INDEX: 39.47 KG/M2 | HEIGHT: 67 IN

## 2021-07-06 DIAGNOSIS — R79.89 OTHER SPECIFIED ABNORMAL FINDINGS OF BLOOD CHEMISTRY: ICD-10-CM

## 2021-07-06 DIAGNOSIS — D50.9 IRON DEFICIENCY ANEMIA, UNSPECIFIED: ICD-10-CM

## 2021-07-06 DIAGNOSIS — Z00.00 ENCOUNTER FOR GENERAL ADULT MEDICAL EXAMINATION W/OUT ABNORMAL FINDINGS: ICD-10-CM

## 2021-07-06 PROCEDURE — 99204 OFFICE O/P NEW MOD 45 MIN: CPT | Mod: 25

## 2021-07-06 PROCEDURE — G0444 DEPRESSION SCREEN ANNUAL: CPT | Mod: 59

## 2021-07-06 PROCEDURE — 99072 ADDL SUPL MATRL&STAF TM PHE: CPT

## 2021-07-06 PROCEDURE — 93000 ELECTROCARDIOGRAM COMPLETE: CPT | Mod: 59

## 2021-07-06 NOTE — PLAN
[FreeTextEntry1] : Nutritional education and weight loss would be very important and she has gargled with weight for many many years\par We'll review bloods after the results are available\par Unable to give urine today\par She will get her colonoscopy\par EKG unremarkable

## 2021-07-06 NOTE — HISTORY OF PRESENT ILLNESS
[FreeTextEntry1] : being seen as new patient [de-identified] : 50 y o female with obesity\par HTN only on beta blocker\par long history hypothyroidism\par prediabetes last A1C 6.0

## 2021-07-06 NOTE — ASSESSMENT
[FreeTextEntry1] : Obese 50-year-old female on oral contraceptives for gynecologic bleeding with no further bleeding\par Blood pressure is okay in the office today\par Pre-diabetes and hypothyroidism seen \par She is not fasting and requests to have her bloods drawn at her morning appointment with endocrinologist next week\par Elevated platelet count in the past no recent CBC\par Suspect her iron deficiency should be much better without any gynecologic bleeding\par Has appointment for colonoscopy consult later this month in [place] with her 's gastroenterologist\par On time with other screenings

## 2021-07-06 NOTE — HEALTH RISK ASSESSMENT
[] : No [0] : 2) Feeling down, depressed, or hopeless: Not at all (0) [PHQ-2 Negative - No further assessment needed] : PHQ-2 Negative - No further assessment needed [[id number]] : 0

## 2021-07-06 NOTE — HISTORY OF PRESENT ILLNESS
[FreeTextEntry1] : here as new patient [de-identified] : hypothyroidism many years sees Dr Slaughter\par prediabetes A1C 6.0\par progressive weight gain\par on OCP for gyn bleeding, now no menses\par regular gyn, on time with mammo\par has appointment for colonoscopy 7/20/2021 in [place]\par elevated platelet count on past bloods\par past iron deficiency and anemia\par elevated platelet count saw Hematologist Sergio Cocnepcion last 7/2019 for iron def anemia

## 2021-07-15 ENCOUNTER — APPOINTMENT (OUTPATIENT)
Dept: ENDOCRINOLOGY | Facility: CLINIC | Age: 51
End: 2021-07-15
Payer: COMMERCIAL

## 2021-07-15 ENCOUNTER — LABORATORY RESULT (OUTPATIENT)
Age: 51
End: 2021-07-15

## 2021-07-15 VITALS
RESPIRATION RATE: 16 BRPM | OXYGEN SATURATION: 98 % | SYSTOLIC BLOOD PRESSURE: 138 MMHG | HEART RATE: 80 BPM | DIASTOLIC BLOOD PRESSURE: 104 MMHG | HEIGHT: 67 IN | WEIGHT: 249 LBS | BODY MASS INDEX: 39.08 KG/M2

## 2021-07-15 PROCEDURE — 36415 COLL VENOUS BLD VENIPUNCTURE: CPT

## 2021-07-15 PROCEDURE — 99214 OFFICE O/P EST MOD 30 MIN: CPT | Mod: 25

## 2021-07-15 PROCEDURE — 99072 ADDL SUPL MATRL&STAF TM PHE: CPT

## 2021-07-15 NOTE — HISTORY OF PRESENT ILLNESS
[FreeTextEntry1] : 50 year female  f/u for hypothyroidism management.\par \par *** Jul 15, 2021 ***\par \par was using whole30 diet, lost 8 lbs so far and regained some b/o lost power in the house.\par still on L-thyroxine 137 mcg, drisdol 50k qw\par no recent labs\par \par *** Mar 17, 2021 ***\par \par more consistent with Synthroid intake, but takes ta 7 pm along with other pills and food\par taking 137 mcg\par Thyr US (12/15/20)-  no nodules\par \par *** Jul 24, 2020 ***\par \par skips synthroid on average 2-3 times a week\par diet is worse. still on "whole 30 diet"\par on synthroid 137 mcg, drisdol 50K qw\par TSH- 4.4\par TG- 362, TC- 244, LDL- 127\par a1c- 5.9\par \par *** Televisit  Apr 02, 2020 ***\par \par on synthroid 137 mcg, drisdol 50K qw\par no recent labs\par weight is stable, reports 222 lbs\par \par *** Nov 12, 2019 ***\par no thyr US yet. no rpt labs \par feels ok overall. lost 13 lbs on the new diet ("whole 30"- low CHO). seeing Dr. Concepcion (hem-onc)\par on L-thyroxine 137mcg\par \par *** July 09, 2019 ***\par  \par TSH- 1.6, FT4- 1.3\par + TPO ab (171), neg tg ab\par did not do thyr US yet\par started diet 3 days ago. Lost 2 lbs so far.\par \par HPI:\par Ms. SHULTZ  was diagnosed with hypothyroidism at the age of 30. \par Previously under care of Dr. Alvarado.\par Most recently on  112 mcg of generic LT4. \par She gained about 70 lbs with her pregnancy about 6 years ago, was able to lose 30 lbs after delivery, and slowly regained another 20 lbs. She tries to eat 3 times a day, but is not watching calories for most of the time, frequently eating large carb/calorie dinner.\par She is very sedentary, putting typically no more than 1000 steps a day.\par She tried WW twice in her twenty's which helped at that time. \par Denies family history of thyroid cancer or history of radiation exposure to head and neck area in a childhood.\par

## 2021-07-15 NOTE — ASSESSMENT
[Carbohydrate Consistent Diet] : carbohydrate consistent diet [Levothyroxine] : The patient was instructed to take Levothyroxine on an empty stomach, separate from vitamins, and wait at least 30 minutes before eating [FreeTextEntry1] : - Current approaches to weight management are discussed with the patient. \par - various diets reviewed again., will cont on present since there's an improvement\par -  we discussed retrying metformin, but patient wants to improve diet and exercises for now\par - cont L-thyroxine 137mcg daily, pending labs. Switch  intake to am , fasting\par - compliance is reiterated\par  and drisdol 50K qw\par - Proper intake of levothyroxine is reviewed in details, including on an empty stomach, with water only, at least one hour before taking any medications, vitamins, or supplements and three-four hours before taking iron or calcium.\par - f/u  hem/onc\par - we discussed starting lipid therapy- pt is reluctant at present. add fish oil, to see our RD\par RTC 6 months, or sooner prn

## 2021-07-16 ENCOUNTER — NON-APPOINTMENT (OUTPATIENT)
Age: 51
End: 2021-07-16

## 2021-07-16 LAB
25(OH)D3 SERPL-MCNC: 30.1 NG/ML
ALBUMIN SERPL ELPH-MCNC: 4.3 G/DL
ALP BLD-CCNC: 49 U/L
ALT SERPL-CCNC: 18 U/L
ANION GAP SERPL CALC-SCNC: 12 MMOL/L
AST SERPL-CCNC: 18 U/L
BASOPHILS # BLD AUTO: 0.05 K/UL
BASOPHILS NFR BLD AUTO: 0.7 %
BILIRUB SERPL-MCNC: 0.3 MG/DL
BUN SERPL-MCNC: 8 MG/DL
CALCIUM SERPL-MCNC: 9.2 MG/DL
CHLORIDE SERPL-SCNC: 103 MMOL/L
CHOLEST SERPL-MCNC: 199 MG/DL
CK SERPL-CCNC: 50 U/L
CO2 SERPL-SCNC: 24 MMOL/L
CREAT SERPL-MCNC: 0.71 MG/DL
EOSINOPHIL # BLD AUTO: 0.23 K/UL
EOSINOPHIL NFR BLD AUTO: 3.1 %
ESTIMATED AVERAGE GLUCOSE: 126 MG/DL
FERRITIN SERPL-MCNC: 4 NG/ML
GLUCOSE SERPL-MCNC: 121 MG/DL
HBA1C MFR BLD HPLC: 6 %
HCT VFR BLD CALC: 36.5 %
HDLC SERPL-MCNC: 39 MG/DL
HGB BLD-MCNC: 10.3 G/DL
IMM GRANULOCYTES NFR BLD AUTO: 0.4 %
IRON SATN MFR SERPL: 6 %
IRON SERPL-MCNC: 24 UG/DL
LDLC SERPL CALC-MCNC: 115 MG/DL
LYMPHOCYTES # BLD AUTO: 2.6 K/UL
LYMPHOCYTES NFR BLD AUTO: 35.4 %
MAN DIFF?: NORMAL
MCHC RBC-ENTMCNC: 24 PG
MCHC RBC-ENTMCNC: 28.2 GM/DL
MCV RBC AUTO: 84.9 FL
MONOCYTES # BLD AUTO: 0.53 K/UL
MONOCYTES NFR BLD AUTO: 7.2 %
NEUTROPHILS # BLD AUTO: 3.91 K/UL
NEUTROPHILS NFR BLD AUTO: 53.2 %
NONHDLC SERPL-MCNC: 160 MG/DL
PLATELET # BLD AUTO: 431 K/UL
POTASSIUM SERPL-SCNC: 5 MMOL/L
PROT SERPL-MCNC: 7.2 G/DL
RBC # BLD: 4.3 M/UL
RBC # FLD: 20.2 %
SODIUM SERPL-SCNC: 138 MMOL/L
T4 FREE SERPL-MCNC: 1.7 NG/DL
TIBC SERPL-MCNC: 428 UG/DL
TRIGL SERPL-MCNC: 229 MG/DL
TSH SERPL-ACNC: 0.33 UIU/ML
UIBC SERPL-MCNC: 404 UG/DL
WBC # FLD AUTO: 7.35 K/UL

## 2021-08-07 ENCOUNTER — RX RENEWAL (OUTPATIENT)
Age: 51
End: 2021-08-07

## 2021-08-11 ENCOUNTER — TRANSCRIPTION ENCOUNTER (OUTPATIENT)
Age: 51
End: 2021-08-11

## 2021-08-11 RX ORDER — METOPROLOL SUCCINATE 50 MG/1
50 TABLET, EXTENDED RELEASE ORAL DAILY
Qty: 90 | Refills: 3 | Status: ACTIVE | COMMUNITY
Start: 1900-01-01 | End: 1900-01-01

## 2021-08-12 ENCOUNTER — TRANSCRIPTION ENCOUNTER (OUTPATIENT)
Age: 51
End: 2021-08-12

## 2022-01-01 ENCOUNTER — TRANSCRIPTION ENCOUNTER (OUTPATIENT)
Age: 52
End: 2022-01-01

## 2022-01-05 ENCOUNTER — LABORATORY RESULT (OUTPATIENT)
Age: 52
End: 2022-01-05

## 2022-01-05 ENCOUNTER — APPOINTMENT (OUTPATIENT)
Dept: ENDOCRINOLOGY | Facility: CLINIC | Age: 52
End: 2022-01-05
Payer: COMMERCIAL

## 2022-01-05 VITALS
BODY MASS INDEX: 39.71 KG/M2 | OXYGEN SATURATION: 98 % | SYSTOLIC BLOOD PRESSURE: 140 MMHG | HEART RATE: 75 BPM | TEMPERATURE: 97.8 F | DIASTOLIC BLOOD PRESSURE: 60 MMHG | HEIGHT: 67 IN | WEIGHT: 253 LBS | RESPIRATION RATE: 16 BRPM

## 2022-01-05 DIAGNOSIS — Z20.822 CONTACT WITH AND (SUSPECTED) EXPOSURE TO COVID-19: ICD-10-CM

## 2022-01-05 PROCEDURE — 99214 OFFICE O/P EST MOD 30 MIN: CPT | Mod: 25

## 2022-01-05 PROCEDURE — 36415 COLL VENOUS BLD VENIPUNCTURE: CPT

## 2022-01-05 NOTE — HISTORY OF PRESENT ILLNESS
[FreeTextEntry1] : 51 year female  f/u for hypothyroidism management.\par \par *** Jan 05, 2022 ***\par \par staying on a modified whole30 diet, myfitness pal. walking daily. lifting weights 3/wk\par taking L-thyroxine 137 mcg, drisdol 50k qw\par reports a proper intake\par \par *** Jul 15, 2021 ***\par \par was using whole30 diet, lost 8 lbs so far and regained some b/o lost power in the house.\par still on L-thyroxine 137 mcg, drisdol 50k qw\par no recent labs\par \par *** Mar 17, 2021 ***\par \par more consistent with Synthroid intake, but takes ta 7 pm along with other pills and food\par taking 137 mcg\par Thyr US (12/15/20)-  no nodules\par \par *** Jul 24, 2020 ***\par \par skips synthroid on average 2-3 times a week\par diet is worse. still on "whole 30 diet"\par on synthroid 137 mcg, drisdol 50K qw\par TSH- 4.4\par TG- 362, TC- 244, LDL- 127\par a1c- 5.9\par \par *** Televisit  Apr 02, 2020 ***\par \par on synthroid 137 mcg, drisdol 50K qw\par no recent labs\par weight is stable, reports 222 lbs\par \par *** Nov 12, 2019 ***\par no thyr US yet. no rpt labs \par feels ok overall. lost 13 lbs on the new diet ("whole 30"- low CHO). seeing Dr. Concepcion (hem-onc)\par on L-thyroxine 137mcg\par \par *** July 09, 2019 ***\par  \par TSH- 1.6, FT4- 1.3\par + TPO ab (171), neg tg ab\par did not do thyr US yet\par started diet 3 days ago. Lost 2 lbs so far.\par \par HPI:\par Ms. SHULTZ  was diagnosed with hypothyroidism at the age of 30. \par Previously under care of Dr. Alvarado.\par Most recently on  112 mcg of generic LT4. \par She gained about 70 lbs with her pregnancy about 6 years ago, was able to lose 30 lbs after delivery, and slowly regained another 20 lbs. She tries to eat 3 times a day, but is not watching calories for most of the time, frequently eating large carb/calorie dinner.\par She is very sedentary, putting typically no more than 1000 steps a day.\par She tried WW twice in her twenty's which helped at that time. \par Denies family history of thyroid cancer or history of radiation exposure to head and neck area in a childhood.\par

## 2022-01-05 NOTE — ASSESSMENT
[Carbohydrate Consistent Diet] : carbohydrate consistent diet [Levothyroxine] : The patient was instructed to take Levothyroxine on an empty stomach, separate from vitamins, and wait at least 30 minutes before eating [FreeTextEntry1] : - Current approaches to weight management are discussed with the patient. \par - various diets reviewed again., will cont on present since there's an improvement\par -  we discussed retrying metformin vs glp1\par - she wants to try Wegovy (R+B)\par - cont L-thyroxine 137mcg daily, pending labs. Switch  intake to am , fasting\par - compliance is reiterated\par -  drisdol 50K qw\par - Proper intake of levothyroxine is reviewed in details, including on an empty stomach, with water only, at least one hour before taking any medications, vitamins, or supplements and three-four hours before taking iron or calcium.\par - f/u  hem/onc\par - we discussed starting lipid therapy- pt is reluctant at present. add fish oil, to see our RD\par - - Current approaches to weight management are discussed with the patient. \par Suggested extensive nutritional education program. Proper dietary restrictions and exercise routines discussed. Medical weight loss therapies were reviewed with the patient. Bariatric options discussed.\par \par RTC 3-6 months, or sooner prn

## 2022-01-12 ENCOUNTER — TRANSCRIPTION ENCOUNTER (OUTPATIENT)
Age: 52
End: 2022-01-12

## 2022-01-12 LAB
25(OH)D3 SERPL-MCNC: 36 NG/ML
ALBUMIN SERPL ELPH-MCNC: 4.6 G/DL
ALP BLD-CCNC: 64 U/L
ALT SERPL-CCNC: 24 U/L
ANION GAP SERPL CALC-SCNC: 15 MMOL/L
AST SERPL-CCNC: 23 U/L
BILIRUB SERPL-MCNC: 0.3 MG/DL
BUN SERPL-MCNC: 14 MG/DL
CALCIUM SERPL-MCNC: 9.5 MG/DL
CHLORIDE SERPL-SCNC: 100 MMOL/L
CHOLEST SERPL-MCNC: 259 MG/DL
CO2 SERPL-SCNC: 24 MMOL/L
COVID-19 NUCLEOCAPSID  GAM ANTIBODY INTERPRETATION: NEGATIVE
COVID-19 SPIKE DOMAIN ANTIBODY INTERPRETATION: POSITIVE
CREAT SERPL-MCNC: 0.78 MG/DL
ESTIMATED AVERAGE GLUCOSE: 134 MG/DL
FOLATE SERPL-MCNC: 8 NG/ML
FRUCTOSAMINE SERPL-MCNC: 241 UMOL/L
GLUCOSE SERPL-MCNC: 73 MG/DL
HBA1C MFR BLD HPLC: 6.3 %
HDLC SERPL-MCNC: 41 MG/DL
LDLC SERPL CALC-MCNC: NORMAL MG/DL
NONHDLC SERPL-MCNC: 219 MG/DL
POTASSIUM SERPL-SCNC: 4.5 MMOL/L
PROT SERPL-MCNC: 7.7 G/DL
SARS-COV-2 AB SERPL IA-ACNC: >250 U/ML
SARS-COV-2 AB SERPL QL IA: 0.07 INDEX
SODIUM SERPL-SCNC: 139 MMOL/L
T4 FREE SERPL-MCNC: 1.4 NG/DL
TRIGL SERPL-MCNC: 434 MG/DL
TSH SERPL-ACNC: 2.22 UIU/ML
VIT B12 SERPL-MCNC: 323 PG/ML

## 2022-01-13 ENCOUNTER — TRANSCRIPTION ENCOUNTER (OUTPATIENT)
Age: 52
End: 2022-01-13

## 2022-02-28 ENCOUNTER — RX RENEWAL (OUTPATIENT)
Age: 52
End: 2022-02-28

## 2022-03-01 ENCOUNTER — RX RENEWAL (OUTPATIENT)
Age: 52
End: 2022-03-01

## 2022-03-21 ENCOUNTER — RX RENEWAL (OUTPATIENT)
Age: 52
End: 2022-03-21

## 2022-03-21 ENCOUNTER — TRANSCRIPTION ENCOUNTER (OUTPATIENT)
Age: 52
End: 2022-03-21

## 2022-05-23 ENCOUNTER — APPOINTMENT (OUTPATIENT)
Dept: ENDOCRINOLOGY | Facility: CLINIC | Age: 52
End: 2022-05-23
Payer: COMMERCIAL

## 2022-05-23 VITALS
HEART RATE: 81 BPM | WEIGHT: 245 LBS | BODY MASS INDEX: 38.45 KG/M2 | OXYGEN SATURATION: 98 % | HEIGHT: 67 IN | SYSTOLIC BLOOD PRESSURE: 130 MMHG | TEMPERATURE: 98.6 F | DIASTOLIC BLOOD PRESSURE: 72 MMHG

## 2022-05-23 PROCEDURE — 99214 OFFICE O/P EST MOD 30 MIN: CPT | Mod: 25

## 2022-05-23 PROCEDURE — 36415 COLL VENOUS BLD VENIPUNCTURE: CPT

## 2022-05-23 NOTE — HISTORY OF PRESENT ILLNESS
[FreeTextEntry1] : 51 year female  f/u for hypothyroidism management.\par \par *** May 23, 2022 ***\par \par feels well overall\par normal colonoscopy last week\par wegovy is not covered. diet is better- lost 8 lbs\par taking L-thyroxine 137 mcg, improved the intake\par did not start crestor\par \par *** Jan 05, 2022 ***\par \par staying on a modified whole30 diet, myfitness pal. walking daily. lifting weights 3/wk\par taking L-thyroxine 137 mcg, drisdol 50k qw\par reports a proper intake\par \par *** Jul 15, 2021 ***\par \par was using whole30 diet, lost 8 lbs so far and regained some b/o lost power in the house.\par still on L-thyroxine 137 mcg, drisdol 50k qw\par no recent labs\par \par *** Mar 17, 2021 ***\par \par more consistent with Synthroid intake, but takes ta 7 pm along with other pills and food\par taking 137 mcg\par Thyr US (12/15/20)-  no nodules\par \par *** Jul 24, 2020 ***\par \par skips synthroid on average 2-3 times a week\par diet is worse. still on "whole 30 diet"\par on synthroid 137 mcg, drisdol 50K qw\par TSH- 4.4\par TG- 362, TC- 244, LDL- 127\par a1c- 5.9\par \par *** Televisit  Apr 02, 2020 ***\par \par on synthroid 137 mcg, drisdol 50K qw\par no recent labs\par weight is stable, reports 222 lbs\par \par *** Nov 12, 2019 ***\par no thyr US yet. no rpt labs \par feels ok overall. lost 13 lbs on the new diet ("whole 30"- low CHO). seeing Dr. Concepcion (hem-onc)\par on L-thyroxine 137mcg\par \par *** July 09, 2019 ***\par  \par TSH- 1.6, FT4- 1.3\par + TPO ab (171), neg tg ab\par did not do thyr US yet\par started diet 3 days ago. Lost 2 lbs so far.\par \par HPI:\par Ms. SHULTZ  was diagnosed with hypothyroidism at the age of 30. \par Previously under care of Dr. Alvarado.\par Most recently on  112 mcg of generic LT4. \par She gained about 70 lbs with her pregnancy about 6 years ago, was able to lose 30 lbs after delivery, and slowly regained another 20 lbs. She tries to eat 3 times a day, but is not watching calories for most of the time, frequently eating large carb/calorie dinner.\par She is very sedentary, putting typically no more than 1000 steps a day.\par She tried WW twice in her twenty's which helped at that time. \par Denies family history of thyroid cancer or history of radiation exposure to head and neck area in a childhood.\par

## 2022-05-23 NOTE — ASSESSMENT
[Carbohydrate Consistent Diet] : carbohydrate consistent diet [Levothyroxine] : The patient was instructed to take Levothyroxine on an empty stomach, separate from vitamins, and wait at least 30 minutes before eating [FreeTextEntry1] : 1. Hypothyroidism\par - labs today\par -  L-thyroxine 137 mcg daily, pending results\par - Proper intake of levothyroxine is reviewed in details, including on an empty stomach, with water only, at least one hour before taking any medications, vitamins, or supplements and three-four hours before taking iron or calcium.\par \par \par 2. Obesity\par - Current approaches to weight management are discussed with the patient. \par - various diets reviewed again., will cont on present since there's an improvement\par - given her labile blood pressure, would avoid most of the weight loss meds for now\par - we discussed retrying metformin vs other glp1. She's reluctant at this point, and wants to focus on her diet\par - Current approaches to weight management are discussed with the patient. \par Suggested extensive nutritional education program. Proper dietary restrictions and exercise routines discussed. Medical weight loss therapies were reviewed with the patient. Bariatric options discussed.\par \par \par 3. Vitamin D deficiency\par -  drisdol 50K qw\par \par - f/u  hem/onc\par - we discussed starting lipid therapy- pt is reluctant at present. add fish oil, to see our RD\par \par \par RTC 3-6 months, or sooner prn

## 2022-05-28 ENCOUNTER — TRANSCRIPTION ENCOUNTER (OUTPATIENT)
Age: 52
End: 2022-05-28

## 2022-05-28 LAB
25(OH)D3 SERPL-MCNC: 31 NG/ML
ALBUMIN SERPL ELPH-MCNC: 4.7 G/DL
ALP BLD-CCNC: 64 U/L
ALT SERPL-CCNC: 24 U/L
ANION GAP SERPL CALC-SCNC: 14 MMOL/L
AST SERPL-CCNC: 20 U/L
BILIRUB SERPL-MCNC: 0.5 MG/DL
BUN SERPL-MCNC: 16 MG/DL
CALCIUM SERPL-MCNC: 9.2 MG/DL
CHLORIDE SERPL-SCNC: 103 MMOL/L
CHOLEST SERPL-MCNC: 238 MG/DL
CO2 SERPL-SCNC: 23 MMOL/L
CREAT SERPL-MCNC: 0.72 MG/DL
EGFR: 101 ML/MIN/1.73M2
ESTIMATED AVERAGE GLUCOSE: 131 MG/DL
GLUCOSE SERPL-MCNC: 106 MG/DL
HBA1C MFR BLD HPLC: 6.2 %
HDLC SERPL-MCNC: 41 MG/DL
LDLC SERPL CALC-MCNC: 131 MG/DL
NONHDLC SERPL-MCNC: 197 MG/DL
POTASSIUM SERPL-SCNC: 4.6 MMOL/L
PROT SERPL-MCNC: 7.6 G/DL
SODIUM SERPL-SCNC: 139 MMOL/L
T4 FREE SERPL-MCNC: 1.8 NG/DL
TRIGL SERPL-MCNC: 328 MG/DL
TSH SERPL-ACNC: 0.09 UIU/ML

## 2022-05-31 ENCOUNTER — TRANSCRIPTION ENCOUNTER (OUTPATIENT)
Age: 52
End: 2022-05-31

## 2022-08-01 ENCOUNTER — TRANSCRIPTION ENCOUNTER (OUTPATIENT)
Age: 52
End: 2022-08-01

## 2022-08-01 ENCOUNTER — APPOINTMENT (OUTPATIENT)
Dept: ORTHOPEDIC SURGERY | Facility: CLINIC | Age: 52
End: 2022-08-01
Payer: COMMERCIAL

## 2022-08-01 DIAGNOSIS — M23.92 UNSPECIFIED INTERNAL DERANGEMENT OF LEFT KNEE: ICD-10-CM

## 2022-08-01 PROCEDURE — 99204 OFFICE O/P NEW MOD 45 MIN: CPT

## 2022-08-01 PROCEDURE — 99203 OFFICE O/P NEW LOW 30 MIN: CPT

## 2022-08-01 PROCEDURE — 73564 X-RAY EXAM KNEE 4 OR MORE: CPT | Mod: LT

## 2022-08-01 RX ORDER — NAPROXEN 500 MG/1
500 TABLET ORAL TWICE DAILY
Qty: 60 | Refills: 0 | Status: ACTIVE | COMMUNITY
Start: 2022-08-01 | End: 1900-01-01

## 2022-08-01 NOTE — ASSESSMENT
[FreeTextEntry1] :  Left X-Ray Examination of the KNEE (4 views): there are no fractures, subluxations or dislocations.\par \par Due to the patients locked knee and mechanical symptoms along with medial joint line pain, effusion, and pos oswaldo test on exam we will get an mri to eval for medial meniscus tear\par \par - The patient was advised to apply ice (wrapped in a towel or protective covering) to the area daily (20 minutes at a time, 2-4X/day).\par - The patient was advised to modify their activities.\par - napryn rx\par - Patient was given a prescription for an anti-inflammatory medication.  They will take it for the next week and then on an as needed basis, as long as there are no medical contra-indications.  Patient is counseled on possible GI, renal, and cardiovascular side effects.\par - f/u after mri\par \par

## 2022-08-01 NOTE — IMAGING
[de-identified] : \par LEFT KNEE\par Inspection:  mild effusion\par Palpation: medial joint line tenderness \par Knee Range of Motion:  5-120\par Strength: 5/5 Quadriceps strength, 5/5 Hamstring strength\par Neurological: light touch is intact throughout\par Ligament Stability and Special Tests: \par McMurrays: Positive\par Lachman: neg\par Pivot Shift: neg\par Posterior Drawer: neg\par Valgus: neg\par Varus: neg\par Patella Apprehension: neg\par Patella Maltracking: neg\par

## 2022-08-01 NOTE — HISTORY OF PRESENT ILLNESS
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  when pt. stepped awkwardly and twisted knee.  pain located at  medial, lateral with associated 'popping'. assoc with catching and locking  pain worse with stairs and activity and trouble stratigtening knee.\par

## 2022-08-04 ENCOUNTER — FORM ENCOUNTER (OUTPATIENT)
Age: 52
End: 2022-08-04

## 2022-08-05 ENCOUNTER — APPOINTMENT (OUTPATIENT)
Dept: MRI IMAGING | Facility: CLINIC | Age: 52
End: 2022-08-05

## 2022-08-05 PROCEDURE — 73721 MRI JNT OF LWR EXTRE W/O DYE: CPT | Mod: LT

## 2022-08-09 PROBLEM — M17.12 ARTHRITIS OF KNEE, LEFT: Status: ACTIVE | Noted: 2022-08-01

## 2022-08-10 ENCOUNTER — APPOINTMENT (OUTPATIENT)
Dept: ORTHOPEDIC SURGERY | Facility: CLINIC | Age: 52
End: 2022-08-10
Payer: COMMERCIAL

## 2022-08-10 DIAGNOSIS — M17.12 UNILATERAL PRIMARY OSTEOARTHRITIS, LEFT KNEE: ICD-10-CM

## 2022-08-10 PROCEDURE — J3490M: CUSTOM

## 2022-08-10 PROCEDURE — 99214 OFFICE O/P EST MOD 30 MIN: CPT | Mod: 25

## 2022-08-10 PROCEDURE — 99213 OFFICE O/P EST LOW 20 MIN: CPT | Mod: 25

## 2022-08-10 PROCEDURE — 20610 DRAIN/INJ JOINT/BURSA W/O US: CPT

## 2022-08-10 NOTE — HISTORY OF PRESENT ILLNESS
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  when pt. stepped awkwardly and twisted knee.  pain located at  medial, lateral with associated 'popping'. assoc with catching and locking  pain worse with stairs and activity and trouble stratigtening knee.\par \par 8/10/22 - had mri showing pf deg, no tears, has been icing and using nsaids

## 2022-08-10 NOTE — ASSESSMENT
[FreeTextEntry1] : mri left knee 8/5/22 - chondral loss pf, eff, synov, subchondral edema, no tears\par \par \par - We discussed their diagnosis and treatment options at length including surgical and non-surgical options.\par - We will continue conservative treatment with activity modification, PT, icing, weight loss, and anti-inflammatory medications.\par - The patient was provided with a PT prescription to work on ROM, hip ER/abductors strengthening, quad/hamstring stretches and strengthening, and other exercises \par - The patient was advised to let pain guide the gradual advancement of activities. \par - We also discussed the possible of a corticosteroid injection in order to help decrease inflammation and pain so that they can perform better therapy.\par - wants to hold off on CSI\par - Follow up as needed in 6 weeks to re-evaluate, if no improvement we spoke about possibility of CSI or viscosupplementation injections\par

## 2022-08-10 NOTE — IMAGING
[de-identified] : \par LEFT KNEE\par Inspection:  mild effusion\par Palpation: medial joint line tenderness, anterior tenderness\par Knee Range of Motion:  3-125 \par Strength: 5/5 Quadriceps strength, 5/5 Hamstring strength\par Neurological: light touch is intact throughout\par Ligament Stability and Special Tests: \par McMurrays: neg\par Lachman: neg\par Pivot Shift: neg\par Posterior Drawer: neg\par Valgus: neg\par Varus: neg\par Patella Apprehension: neg\par Patella Maltracking: neg\par

## 2022-11-03 ENCOUNTER — APPOINTMENT (OUTPATIENT)
Dept: ENDOCRINOLOGY | Facility: CLINIC | Age: 52
End: 2022-11-03

## 2022-11-03 ENCOUNTER — LABORATORY RESULT (OUTPATIENT)
Age: 52
End: 2022-11-03

## 2022-11-03 VITALS
RESPIRATION RATE: 16 BRPM | SYSTOLIC BLOOD PRESSURE: 120 MMHG | TEMPERATURE: 97 F | DIASTOLIC BLOOD PRESSURE: 70 MMHG | HEART RATE: 75 BPM | BODY MASS INDEX: 38.3 KG/M2 | OXYGEN SATURATION: 98 % | WEIGHT: 244 LBS | HEIGHT: 67 IN

## 2022-11-03 PROCEDURE — 36415 COLL VENOUS BLD VENIPUNCTURE: CPT

## 2022-11-03 PROCEDURE — 99214 OFFICE O/P EST MOD 30 MIN: CPT | Mod: 25

## 2022-11-03 RX ORDER — OLMESARTAN MEDOXOMIL 5 MG/1
5 TABLET, FILM COATED ORAL DAILY
Qty: 90 | Refills: 0 | Status: DISCONTINUED | COMMUNITY
Start: 2022-07-29 | End: 2022-11-03

## 2022-11-03 NOTE — ASSESSMENT
[Carbohydrate Consistent Diet] : carbohydrate consistent diet [Levothyroxine] : The patient was instructed to take Levothyroxine on an empty stomach, separate from vitamins, and wait at least 30 minutes before eating [FreeTextEntry1] : 1. Hypothyroidism\par - labs today\par -  L-thyroxine 125 mcg daily, pending results\par - Proper intake of levothyroxine is reviewed in details, including on an empty stomach, with water only, at least one hour before taking any medications, vitamins, or supplements and three-four hours before taking iron or calcium.\par \par \par 2. Obesity\par - Current approaches to weight management are discussed with the patient. \par - various diets reviewed again., will cont on present since there's an improvement\par - given her labile blood pressure, would avoid most of the weight loss meds for now\par - we discussed retrying metformin vs other glp1. She's still reluctant at this point, and wants to focus on her diet\par - Current approaches to weight management are discussed with the patient. \par Suggested extensive nutritional education program. Proper dietary restrictions and exercise routines discussed. Medical weight loss therapies were reviewed with the patient. Bariatric options discussed.\par \par \par 3. Vitamin D deficiency\par -  drisdol 50K qw\par \par 4. Hyperlipidemia\par - again advised on starting lipid therapy- will start post labs (poss switch to Lipitor b/o insurance issues)\par add fish oil, to see our RD\par \par \par RTC 3-6 months, or sooner prn

## 2022-11-03 NOTE — HISTORY OF PRESENT ILLNESS
[FreeTextEntry1] : 52 year female  f/u for hypothyroidism management.\par \par *** Nov 03, 2022 ***\par \par started exercising few mos ago, and tore her ACL over the summer.\par weight is stable from the last visit\par remains on  L-thyroxine 125 mcg daily,  Metoprolol ER 50 mg qd, Olmesartan 10 mg qd (dose increased)\par was rx'ed Crestor 10 mg HS, but per patient it was not covered\par \par *** May 23, 2022 ***\par \par feels well overall\par normal colonoscopy last week\par wegovy is not covered. diet is better- lost 8 lbs\par taking L-thyroxine 137 mcg, improved the intake\par did not start crestor\par \par *** Jan 05, 2022 ***\par \par staying on a modified whole30 diet, myfitness pal. walking daily. lifting weights 3/wk\par taking L-thyroxine 137 mcg, drisdol 50k qw\par reports a proper intake\par \par *** Jul 15, 2021 ***\par \par was using whole30 diet, lost 8 lbs so far and regained some b/o lost power in the house.\par still on L-thyroxine 137 mcg, drisdol 50k qw\par no recent labs\par \par *** Mar 17, 2021 ***\par \par more consistent with Synthroid intake, but takes ta 7 pm along with other pills and food\par taking 137 mcg\par Thyr US (12/15/20)-  no nodules\par \par *** Jul 24, 2020 ***\par \par skips synthroid on average 2-3 times a week\par diet is worse. still on "whole 30 diet"\par on synthroid 137 mcg, drisdol 50K qw\par TSH- 4.4\par TG- 362, TC- 244, LDL- 127\par a1c- 5.9\par \par *** Televisit  Apr 02, 2020 ***\par \par on synthroid 137 mcg, drisdol 50K qw\par no recent labs\par weight is stable, reports 222 lbs\par \par *** Nov 12, 2019 ***\par no thyr US yet. no rpt labs \par feels ok overall. lost 13 lbs on the new diet ("whole 30"- low CHO). seeing Dr. Concepcion (hem-onc)\par on L-thyroxine 137mcg\par \par *** July 09, 2019 ***\par  \par TSH- 1.6, FT4- 1.3\par + TPO ab (171), neg tg ab\par did not do thyr US yet\par started diet 3 days ago. Lost 2 lbs so far.\par \par HPI:\par Ms. SHULTZ  was diagnosed with hypothyroidism at the age of 30. \par Previously under care of Dr. Alvarado.\par Most recently on  112 mcg of generic LT4. \par She gained about 70 lbs with her pregnancy about 6 years ago, was able to lose 30 lbs after delivery, and slowly regained another 20 lbs. She tries to eat 3 times a day, but is not watching calories for most of the time, frequently eating large carb/calorie dinner.\par She is very sedentary, putting typically no more than 1000 steps a day.\par She tried WW twice in her twenty's which helped at that time. \par Denies family history of thyroid cancer or history of radiation exposure to head and neck area in a childhood.\par

## 2022-11-04 ENCOUNTER — TRANSCRIPTION ENCOUNTER (OUTPATIENT)
Age: 52
End: 2022-11-04

## 2022-11-04 LAB
25(OH)D3 SERPL-MCNC: 39.8 NG/ML
ALBUMIN SERPL ELPH-MCNC: 4.3 G/DL
ALP BLD-CCNC: 60 U/L
ALT SERPL-CCNC: 25 U/L
ANION GAP SERPL CALC-SCNC: 11 MMOL/L
AST SERPL-CCNC: 25 U/L
BILIRUB SERPL-MCNC: 0.5 MG/DL
BUN SERPL-MCNC: 14 MG/DL
CALCIUM SERPL-MCNC: 9.7 MG/DL
CHLORIDE SERPL-SCNC: 102 MMOL/L
CHOLEST SERPL-MCNC: 261 MG/DL
CO2 SERPL-SCNC: 26 MMOL/L
CREAT SERPL-MCNC: 0.69 MG/DL
EGFR: 104 ML/MIN/1.73M2
ESTIMATED AVERAGE GLUCOSE: 128 MG/DL
GLUCOSE SERPL-MCNC: 106 MG/DL
HBA1C MFR BLD HPLC: 6.1 %
HDLC SERPL-MCNC: 41 MG/DL
LDLC SERPL CALC-MCNC: NORMAL MG/DL
NONHDLC SERPL-MCNC: 221 MG/DL
POTASSIUM SERPL-SCNC: 5.2 MMOL/L
PROT SERPL-MCNC: 7.5 G/DL
SODIUM SERPL-SCNC: 139 MMOL/L
T4 FREE SERPL-MCNC: 1.5 NG/DL
TRIGL SERPL-MCNC: 426 MG/DL
TSH SERPL-ACNC: 0.21 UIU/ML

## 2022-11-04 RX ORDER — FENOFIBRATE 145 MG/1
145 TABLET, COATED ORAL DAILY
Qty: 90 | Refills: 2 | Status: ACTIVE | COMMUNITY
Start: 2022-11-04 | End: 1900-01-01

## 2022-11-06 ENCOUNTER — TRANSCRIPTION ENCOUNTER (OUTPATIENT)
Age: 52
End: 2022-11-06

## 2022-11-17 ENCOUNTER — TRANSCRIPTION ENCOUNTER (OUTPATIENT)
Age: 52
End: 2022-11-17

## 2022-12-08 ENCOUNTER — TRANSCRIPTION ENCOUNTER (OUTPATIENT)
Age: 52
End: 2022-12-08

## 2023-02-02 ENCOUNTER — RX RENEWAL (OUTPATIENT)
Age: 53
End: 2023-02-02

## 2023-03-06 ENCOUNTER — RX RENEWAL (OUTPATIENT)
Age: 53
End: 2023-03-06

## 2023-03-07 ENCOUNTER — RX RENEWAL (OUTPATIENT)
Age: 53
End: 2023-03-07

## 2023-05-04 ENCOUNTER — APPOINTMENT (OUTPATIENT)
Dept: ENDOCRINOLOGY | Facility: CLINIC | Age: 53
End: 2023-05-04
Payer: COMMERCIAL

## 2023-05-04 VITALS
DIASTOLIC BLOOD PRESSURE: 88 MMHG | HEIGHT: 67 IN | HEART RATE: 84 BPM | RESPIRATION RATE: 16 BRPM | TEMPERATURE: 98 F | SYSTOLIC BLOOD PRESSURE: 124 MMHG | WEIGHT: 248 LBS | OXYGEN SATURATION: 98 % | BODY MASS INDEX: 38.92 KG/M2

## 2023-05-04 DIAGNOSIS — E55.9 VITAMIN D DEFICIENCY, UNSPECIFIED: ICD-10-CM

## 2023-05-04 DIAGNOSIS — R73.03 PREDIABETES.: ICD-10-CM

## 2023-05-04 PROCEDURE — 36415 COLL VENOUS BLD VENIPUNCTURE: CPT

## 2023-05-04 PROCEDURE — 99214 OFFICE O/P EST MOD 30 MIN: CPT | Mod: 25

## 2023-05-04 NOTE — HISTORY OF PRESENT ILLNESS
[FreeTextEntry1] : 52 year female  f/u for hypothyroidism management.\par \par *** May 04, 2023 ***\par \par dealing with fibroids; used a new medication, that thinks was aggravating her blood pressure\par was exercising daily, but had to stop b.o worsening bleeding\par was on keto, but stopped last week. gaining weight\par staying on L-thyroxine 112 mcg daily,  Metoprolol ER 50 mg qd, Olmesartan 10 mg qd, crestor 10 mg HS\par did not start fenofibrate yet\par \par *** Nov 03, 2022 ***\par \par started exercising few mos ago, and tore her ACL over the summer.\par weight is stable from the last visit\par remains on  L-thyroxine 125 mcg daily,  Metoprolol ER 50 mg qd, Olmesartan 10 mg qd (dose increased)\par was rx'ed Crestor 10 mg HS, but per patient it was not covered\par \par *** May 23, 2022 ***\par \par feels well overall\par normal colonoscopy last week\par wegovy is not covered. diet is better- lost 8 lbs\par taking L-thyroxine 137 mcg, improved the intake\par did not start crestor\par \par *** Jan 05, 2022 ***\par \par staying on a modified whole30 diet, myfitness pal. walking daily. lifting weights 3/wk\par taking L-thyroxine 137 mcg, drisdol 50k qw\par reports a proper intake\par \par *** Jul 15, 2021 ***\par \par was using whole30 diet, lost 8 lbs so far and regained some b/o lost power in the house.\par still on L-thyroxine 137 mcg, drisdol 50k qw\par no recent labs\par \par *** Mar 17, 2021 ***\par \par more consistent with Synthroid intake, but takes ta 7 pm along with other pills and food\par taking 137 mcg\par Thyr US (12/15/20)-  no nodules\par \par *** Jul 24, 2020 ***\par \par skips synthroid on average 2-3 times a week\par diet is worse. still on "whole 30 diet"\par on synthroid 137 mcg, drisdol 50K qw\par TSH- 4.4\par TG- 362, TC- 244, LDL- 127\par a1c- 5.9\par \par *** Televisit  Apr 02, 2020 ***\par \par on synthroid 137 mcg, drisdol 50K qw\par no recent labs\par weight is stable, reports 222 lbs\par \par *** Nov 12, 2019 ***\par no thyr US yet. no rpt labs \par feels ok overall. lost 13 lbs on the new diet ("whole 30"- low CHO). seeing Dr. Concepcion (hem-onc)\par on L-thyroxine 137mcg\par \par *** July 09, 2019 ***\par  \par TSH- 1.6, FT4- 1.3\par + TPO ab (171), neg tg ab\par did not do thyr US yet\par started diet 3 days ago. Lost 2 lbs so far.\par \par HPI:\par Ms. SHULTZ  was diagnosed with hypothyroidism at the age of 30. \par Previously under care of Dr. Alvarado.\par Most recently on  112 mcg of generic LT4. \par She gained about 70 lbs with her pregnancy about 6 years ago, was able to lose 30 lbs after delivery, and slowly regained another 20 lbs. She tries to eat 3 times a day, but is not watching calories for most of the time, frequently eating large carb/calorie dinner.\par She is very sedentary, putting typically no more than 1000 steps a day.\par She tried WW twice in her twenty's which helped at that time. \par Denies family history of thyroid cancer or history of radiation exposure to head and neck area in a childhood.\par

## 2023-05-04 NOTE — ASSESSMENT
[Carbohydrate Consistent Diet] : carbohydrate consistent diet [Levothyroxine] : The patient was instructed to take Levothyroxine on an empty stomach, separate from vitamins, and wait at least 30 minutes before eating [FreeTextEntry1] : 1. Hypothyroidism\par - labs today\par - L-thyroxine 112 mcg daily, pending results\par - Proper intake of levothyroxine is reviewed in details, including on an empty stomach, with water only, at least one hour before taking any medications, vitamins, or supplements and three-four hours before taking iron or calcium.\par \par 2. Obesity\par - Current approaches to weight management are discussed with the patient. \par - various diets reviewed again., will cont on present since there's an improvement\par - given her labile blood pressure, would avoid most of the weight loss meds for now\par - we discussed retrying metformin vs other glp1. She's still reluctant at this point, and wants to focus on her diet\par - Current approaches to weight management are discussed with the patient. \par Suggested extensive nutritional education program. Proper dietary restrictions and exercise routines discussed. Medical weight loss therapies were reviewed with the patient. Bariatric options discussed.\par \par \par 3. Vitamin D deficiency\par -  drisdol 50K qw\par \par 4. Hyperlipidemia\par - crestor 10 mg HS\par - might need to resume fenofibrate. add fish oil, to see our RD\par \par \par RTC 3 months, or sooner prn

## 2023-05-05 ENCOUNTER — TRANSCRIPTION ENCOUNTER (OUTPATIENT)
Age: 53
End: 2023-05-05

## 2023-05-05 LAB
25(OH)D3 SERPL-MCNC: 59 NG/ML
ALBUMIN SERPL ELPH-MCNC: 4.6 G/DL
ALP BLD-CCNC: 62 U/L
ALT SERPL-CCNC: 31 U/L
ANION GAP SERPL CALC-SCNC: 17 MMOL/L
AST SERPL-CCNC: 33 U/L
BILIRUB SERPL-MCNC: 0.4 MG/DL
BUN SERPL-MCNC: 15 MG/DL
CALCIUM SERPL-MCNC: 10 MG/DL
CHLORIDE SERPL-SCNC: 103 MMOL/L
CHOLEST SERPL-MCNC: 168 MG/DL
CO2 SERPL-SCNC: 22 MMOL/L
CREAT SERPL-MCNC: 0.77 MG/DL
EGFR: 93 ML/MIN/1.73M2
ESTIMATED AVERAGE GLUCOSE: 128 MG/DL
GLUCOSE SERPL-MCNC: 119 MG/DL
HBA1C MFR BLD HPLC: 6.1 %
HDLC SERPL-MCNC: 49 MG/DL
LDLC SERPL CALC-MCNC: 69 MG/DL
NONHDLC SERPL-MCNC: 120 MG/DL
POTASSIUM SERPL-SCNC: 5.1 MMOL/L
PROT SERPL-MCNC: 7.7 G/DL
SODIUM SERPL-SCNC: 142 MMOL/L
T4 FREE SERPL-MCNC: 1.4 NG/DL
TRIGL SERPL-MCNC: 251 MG/DL
TSH SERPL-ACNC: 2.57 UIU/ML

## 2023-05-07 ENCOUNTER — TRANSCRIPTION ENCOUNTER (OUTPATIENT)
Age: 53
End: 2023-05-07

## 2023-06-22 ENCOUNTER — RX RENEWAL (OUTPATIENT)
Age: 53
End: 2023-06-22

## 2023-07-13 ENCOUNTER — TRANSCRIPTION ENCOUNTER (OUTPATIENT)
Age: 53
End: 2023-07-13

## 2023-07-13 ENCOUNTER — NON-APPOINTMENT (OUTPATIENT)
Age: 53
End: 2023-07-13

## 2023-08-01 ENCOUNTER — RX RENEWAL (OUTPATIENT)
Age: 53
End: 2023-08-01

## 2023-08-07 ENCOUNTER — TRANSCRIPTION ENCOUNTER (OUTPATIENT)
Age: 53
End: 2023-08-07

## 2023-08-07 RX ORDER — SEMAGLUTIDE 1 MG/.5ML
1 INJECTION, SOLUTION SUBCUTANEOUS
Qty: 3 | Refills: 1 | Status: ACTIVE | COMMUNITY
Start: 2022-01-05 | End: 1900-01-01

## 2023-08-28 ENCOUNTER — TRANSCRIPTION ENCOUNTER (OUTPATIENT)
Age: 53
End: 2023-08-28

## 2023-09-12 ENCOUNTER — APPOINTMENT (OUTPATIENT)
Dept: ENDOCRINOLOGY | Facility: CLINIC | Age: 53
End: 2023-09-12
Payer: COMMERCIAL

## 2023-09-12 VITALS
HEIGHT: 67 IN | DIASTOLIC BLOOD PRESSURE: 85 MMHG | OXYGEN SATURATION: 96 % | WEIGHT: 245 LBS | BODY MASS INDEX: 38.45 KG/M2 | SYSTOLIC BLOOD PRESSURE: 124 MMHG | HEART RATE: 84 BPM

## 2023-09-12 PROCEDURE — 99214 OFFICE O/P EST MOD 30 MIN: CPT | Mod: 25

## 2023-09-12 PROCEDURE — 36415 COLL VENOUS BLD VENIPUNCTURE: CPT

## 2023-09-13 LAB
ALBUMIN SERPL ELPH-MCNC: 4.6 G/DL
ALP BLD-CCNC: 58 U/L
ALT SERPL-CCNC: 28 U/L
ANION GAP SERPL CALC-SCNC: 15 MMOL/L
AST SERPL-CCNC: 30 U/L
BASOPHILS # BLD AUTO: 0.09 K/UL
BASOPHILS NFR BLD AUTO: 1.1 %
BILIRUB SERPL-MCNC: 0.7 MG/DL
BUN SERPL-MCNC: 12 MG/DL
CALCIUM SERPL-MCNC: 9.2 MG/DL
CHLORIDE SERPL-SCNC: 103 MMOL/L
CHOLEST SERPL-MCNC: 175 MG/DL
CO2 SERPL-SCNC: 23 MMOL/L
CREAT SERPL-MCNC: 0.71 MG/DL
CREAT SPEC-SCNC: 186 MG/DL
EGFR: 102 ML/MIN/1.73M2
EOSINOPHIL # BLD AUTO: 0.42 K/UL
EOSINOPHIL NFR BLD AUTO: 4.9 %
ESTIMATED AVERAGE GLUCOSE: 123 MG/DL
FOLATE SERPL-MCNC: 7.8 NG/ML
GLUCOSE SERPL-MCNC: 87 MG/DL
HBA1C MFR BLD HPLC: 5.9 %
HCT VFR BLD CALC: 36.8 %
HDLC SERPL-MCNC: 43 MG/DL
HGB BLD-MCNC: 12.3 G/DL
IMM GRANULOCYTES NFR BLD AUTO: 0.5 %
LDLC SERPL CALC-MCNC: 85 MG/DL
LYMPHOCYTES # BLD AUTO: 2.82 K/UL
LYMPHOCYTES NFR BLD AUTO: 32.9 %
MAN DIFF?: NORMAL
MCHC RBC-ENTMCNC: 32.6 PG
MCHC RBC-ENTMCNC: 33.4 GM/DL
MCV RBC AUTO: 97.6 FL
MICROALBUMIN 24H UR DL<=1MG/L-MCNC: <1.2 MG/DL
MICROALBUMIN/CREAT 24H UR-RTO: NORMAL MG/G
MONOCYTES # BLD AUTO: 0.5 K/UL
MONOCYTES NFR BLD AUTO: 5.8 %
NEUTROPHILS # BLD AUTO: 4.69 K/UL
NEUTROPHILS NFR BLD AUTO: 54.8 %
NONHDLC SERPL-MCNC: 132 MG/DL
PLATELET # BLD AUTO: 324 K/UL
POTASSIUM SERPL-SCNC: 4.4 MMOL/L
PROT SERPL-MCNC: 7.5 G/DL
RBC # BLD: 3.77 M/UL
RBC # FLD: 13.8 %
SODIUM SERPL-SCNC: 140 MMOL/L
T4 FREE SERPL-MCNC: 1.5 NG/DL
TRIGL SERPL-MCNC: 290 MG/DL
TSH SERPL-ACNC: 2.42 UIU/ML
VIT B12 SERPL-MCNC: 390 PG/ML
WBC # FLD AUTO: 8.56 K/UL

## 2023-09-14 ENCOUNTER — NON-APPOINTMENT (OUTPATIENT)
Age: 53
End: 2023-09-14

## 2023-09-19 ENCOUNTER — RX RENEWAL (OUTPATIENT)
Age: 53
End: 2023-09-19

## 2023-09-25 ENCOUNTER — TRANSCRIPTION ENCOUNTER (OUTPATIENT)
Age: 53
End: 2023-09-25

## 2023-11-02 ENCOUNTER — RX RENEWAL (OUTPATIENT)
Age: 53
End: 2023-11-02

## 2023-11-27 ENCOUNTER — NON-APPOINTMENT (OUTPATIENT)
Age: 53
End: 2023-11-27

## 2023-11-28 DIAGNOSIS — Z92.89 PERSONAL HISTORY OF OTHER MEDICAL TREATMENT: ICD-10-CM

## 2023-11-28 DIAGNOSIS — Z82.49 FAMILY HISTORY OF ISCHEMIC HEART DISEASE AND OTHER DISEASES OF THE CIRCULATORY SYSTEM: ICD-10-CM

## 2023-11-28 DIAGNOSIS — U07.1 COVID-19: ICD-10-CM

## 2023-11-28 DIAGNOSIS — Z98.890 OTHER SPECIFIED POSTPROCEDURAL STATES: ICD-10-CM

## 2023-12-12 ENCOUNTER — APPOINTMENT (OUTPATIENT)
Dept: ENDOCRINOLOGY | Facility: CLINIC | Age: 53
End: 2023-12-12
Payer: COMMERCIAL

## 2023-12-12 VITALS
BODY MASS INDEX: 35.79 KG/M2 | HEART RATE: 78 BPM | WEIGHT: 228 LBS | OXYGEN SATURATION: 98 % | SYSTOLIC BLOOD PRESSURE: 126 MMHG | HEIGHT: 67 IN | DIASTOLIC BLOOD PRESSURE: 70 MMHG | TEMPERATURE: 97.6 F | RESPIRATION RATE: 16 BRPM

## 2023-12-12 LAB — GLUCOSE BLDC GLUCOMTR-MCNC: 78

## 2023-12-12 PROCEDURE — 82962 GLUCOSE BLOOD TEST: CPT

## 2023-12-12 PROCEDURE — 99214 OFFICE O/P EST MOD 30 MIN: CPT | Mod: 25

## 2023-12-12 PROCEDURE — 36415 COLL VENOUS BLD VENIPUNCTURE: CPT

## 2023-12-13 ENCOUNTER — APPOINTMENT (OUTPATIENT)
Dept: INTERNAL MEDICINE | Facility: CLINIC | Age: 53
End: 2023-12-13

## 2023-12-20 LAB
ALBUMIN SERPL ELPH-MCNC: 4.6 G/DL
ALP BLD-CCNC: 57 U/L
ALT SERPL-CCNC: 26 U/L
ANION GAP SERPL CALC-SCNC: 15 MMOL/L
AST SERPL-CCNC: 26 U/L
BILIRUB SERPL-MCNC: 0.5 MG/DL
BUN SERPL-MCNC: 10 MG/DL
CALCIUM SERPL-MCNC: 9.4 MG/DL
CHLORIDE SERPL-SCNC: 102 MMOL/L
CHOLEST SERPL-MCNC: 148 MG/DL
CO2 SERPL-SCNC: 24 MMOL/L
CREAT SERPL-MCNC: 0.73 MG/DL
EGFR: 98 ML/MIN/1.73M2
ESTIMATED AVERAGE GLUCOSE: 123 MG/DL
GLUCOSE SERPL-MCNC: 89 MG/DL
HBA1C MFR BLD HPLC: 5.9 %
HDLC SERPL-MCNC: 43 MG/DL
LDLC SERPL CALC-MCNC: 63 MG/DL
NONHDLC SERPL-MCNC: 104 MG/DL
POTASSIUM SERPL-SCNC: 4.4 MMOL/L
PROT SERPL-MCNC: 7.4 G/DL
SODIUM SERPL-SCNC: 141 MMOL/L
T4 FREE SERPL-MCNC: 2.2 NG/DL
TRIGL SERPL-MCNC: 262 MG/DL
TSH SERPL-ACNC: 0.03 UIU/ML

## 2023-12-20 RX ORDER — LEVOTHYROXINE SODIUM 0.1 MG/1
100 TABLET ORAL DAILY
Qty: 90 | Refills: 2 | Status: ACTIVE | COMMUNITY
Start: 2022-03-21 | End: 2024-09-15

## 2023-12-26 ENCOUNTER — RX RENEWAL (OUTPATIENT)
Age: 53
End: 2023-12-26

## 2023-12-29 ENCOUNTER — RX RENEWAL (OUTPATIENT)
Age: 53
End: 2023-12-29

## 2024-01-03 ENCOUNTER — RX RENEWAL (OUTPATIENT)
Age: 54
End: 2024-01-03

## 2024-01-03 ENCOUNTER — TRANSCRIPTION ENCOUNTER (OUTPATIENT)
Age: 54
End: 2024-01-03

## 2024-01-04 ENCOUNTER — TRANSCRIPTION ENCOUNTER (OUTPATIENT)
Age: 54
End: 2024-01-04

## 2024-01-29 ENCOUNTER — RX RENEWAL (OUTPATIENT)
Age: 54
End: 2024-01-29

## 2024-01-29 RX ORDER — ROSUVASTATIN CALCIUM 10 MG/1
10 TABLET, FILM COATED ORAL
Qty: 90 | Refills: 2 | Status: ACTIVE | COMMUNITY
Start: 2022-01-12 | End: 1900-01-01

## 2024-02-05 ENCOUNTER — NON-APPOINTMENT (OUTPATIENT)
Age: 54
End: 2024-02-05

## 2024-02-05 DIAGNOSIS — Z78.9 OTHER SPECIFIED HEALTH STATUS: ICD-10-CM

## 2024-02-05 DIAGNOSIS — D21.9 BENIGN NEOPLASM OF CONNECTIVE AND OTHER SOFT TISSUE, UNSPECIFIED: ICD-10-CM

## 2024-02-05 RX ORDER — METOPROLOL SUCCINATE 25 MG/1
25 TABLET, EXTENDED RELEASE ORAL DAILY
Refills: 0 | Status: ACTIVE | COMMUNITY

## 2024-02-09 ENCOUNTER — TRANSCRIPTION ENCOUNTER (OUTPATIENT)
Age: 54
End: 2024-02-09

## 2024-02-10 ENCOUNTER — TRANSCRIPTION ENCOUNTER (OUTPATIENT)
Age: 54
End: 2024-02-10

## 2024-03-11 ENCOUNTER — NON-APPOINTMENT (OUTPATIENT)
Age: 54
End: 2024-03-11

## 2024-03-12 ENCOUNTER — APPOINTMENT (OUTPATIENT)
Dept: ENDOCRINOLOGY | Facility: CLINIC | Age: 54
End: 2024-03-12
Payer: COMMERCIAL

## 2024-03-12 VITALS
BODY MASS INDEX: 35.63 KG/M2 | RESPIRATION RATE: 16 BRPM | HEART RATE: 87 BPM | WEIGHT: 227 LBS | OXYGEN SATURATION: 99 % | HEIGHT: 67 IN | DIASTOLIC BLOOD PRESSURE: 80 MMHG | SYSTOLIC BLOOD PRESSURE: 118 MMHG | TEMPERATURE: 97.6 F

## 2024-03-12 LAB — GLUCOSE BLDC GLUCOMTR-MCNC: 187

## 2024-03-12 PROCEDURE — 99214 OFFICE O/P EST MOD 30 MIN: CPT | Mod: 25

## 2024-03-12 PROCEDURE — 82962 GLUCOSE BLOOD TEST: CPT

## 2024-03-12 PROCEDURE — 36415 COLL VENOUS BLD VENIPUNCTURE: CPT

## 2024-03-12 NOTE — HISTORY OF PRESENT ILLNESS
[FreeTextEntry1] : 53 year female  f/u for hypothyroidism management.  *** Mar 12, 2024 ***  had 2 trips to ER d/t kidney stones saw urologist- started on flomax with some relief  working with a nutritionist. so far lost 20 lbs over the past year staying on L-thyroxine  100 mcg, crestor 10 mg HS came off Olmesartan , but stayed on metoprolol today ppg in the office- 187  *** Dec 12, 2023 ***  had a kidney stone in october. also, dx'ed with a uterine cancer s/p lap hysterectomy and BSO at [facility] [place] on 12/06/23 was able to lose 20 lbs prior to the surgery (diet only) mounjaro was not approved no recent labs  *** Sep 12, 2023 ***  had covis a month ago started Wegovy, currently off x 3 weeks d/t shortage. felt nauseous on it lost about 12 lbs while was taking it, regained some past 3 weeks remains on L-thyroxine 112 mcg daily,  Metoprolol ER 50 mg qd, Olmesartan 10 mg qd, crestor 10 mg HS  *** May 04, 2023 ***  dealing with fibroids; used a new medication, that thinks was aggravating her blood pressure was exercising daily, but had to stop b.o worsening bleeding was on keto, but stopped last week. gaining weight staying on L-thyroxine 112 mcg daily,  Metoprolol ER 50 mg qd, Olmesartan 10 mg qd, crestor 10 mg HS did not start fenofibrate yet  *** Nov 03, 2022 ***  started exercising few mos ago, and tore her ACL over the summer. weight is stable from the last visit remains on  L-thyroxine 125 mcg daily,  Metoprolol ER 50 mg qd, Olmesartan 10 mg qd (dose increased) was rx'ed Crestor 10 mg HS, but per patient it was not covered  *** May 23, 2022 ***  feels well overall normal colonoscopy last week wegovy is not covered. diet is better- lost 8 lbs taking L-thyroxine 137 mcg, improved the intake did not start crestor  *** Jan 05, 2022 ***  staying on a modified whole30 diet, myfitness pal. walking daily. lifting weights 3/wk taking L-thyroxine 137 mcg, drisdol 50k qw reports a proper intake  *** Jul 15, 2021 ***  was using whole30 diet, lost 8 lbs so far and regained some b/o lost power in the house. still on L-thyroxine 137 mcg, drisdol 50k qw no recent labs  *** Mar 17, 2021 ***  more consistent with Synthroid intake, but takes ta 7 pm along with other pills and food taking 137 mcg Thyr US (12/15/20)-  no nodules  *** Jul 24, 2020 ***  skips synthroid on average 2-3 times a week diet is worse. still on "whole 30 diet" on synthroid 137 mcg, drisdol 50K qw TSH- 4.4 TG- 362, TC- 244, LDL- 127 a1c- 5.9  *** Televisit  Apr 02, 2020 ***  on synthroid 137 mcg, drisdol 50K qw no recent labs weight is stable, reports 222 lbs  *** Nov 12, 2019 *** no thyr US yet. no rpt labs  feels ok overall. lost 13 lbs on the new diet ("whole 30"- low CHO). seeing Dr. Concepcion (hem-onc) on L-thyroxine 137mcg  *** July 09, 2019 ***   TSH- 1.6, FT4- 1.3 + TPO ab (171), neg tg ab did not do thyr US yet started diet 3 days ago. Lost 2 lbs so far.  HPI: Ms. SHULTZ  was diagnosed with hypothyroidism at the age of 30.  Previously under care of Dr. Alvarado. Most recently on  112 mcg of generic LT4.  She gained about 70 lbs with her pregnancy about 6 years ago, was able to lose 30 lbs after delivery, and slowly regained another 20 lbs. She tries to eat 3 times a day, but is not watching calories for most of the time, frequently eating large carb/calorie dinner. She is very sedentary, putting typically no more than 1000 steps a day. She tried WW twice in her twenty's which helped at that time.  Denies family history of thyroid cancer or history of radiation exposure to head and neck area in a childhood.

## 2024-03-12 NOTE — ASSESSMENT
[FreeTextEntry1] : 1. Hypothyroidism - labs today - L-thyroxine 100 mcg daily, pending results - Proper intake of levothyroxine is reviewed in details, including on an empty stomach, with water only, at least one hour before taking any medications, vitamins, or supplements and three-four hours before taking iron or calcium.  2. Obesity. Borderline diabetes mellitus - Current approaches to weight management are discussed with the patient. - various diets reviewed again., will cont on present since there's an improvement - given her labile blood pressure, would avoid most of the weight loss meds for now - intolerant of wegovy . advised on Zepbound- at present she wants to continue with her current regimen  3. Vitamin D deficiency - drisdol 50K qw  4. Hyperlipidemia - crestor 10 mg HS - might need to resume fenofibrate. add fish oil, to see our RD  RTC 3 months, or sooner prn.   [Levothyroxine] : The patient was instructed to take Levothyroxine on an empty stomach, separate from vitamins, and wait at least 30 minutes before eating [Carbohydrate Consistent Diet] : carbohydrate consistent diet

## 2024-03-13 ENCOUNTER — TRANSCRIPTION ENCOUNTER (OUTPATIENT)
Age: 54
End: 2024-03-13

## 2024-03-13 LAB
ALBUMIN SERPL ELPH-MCNC: 4.7 G/DL
ALP BLD-CCNC: 69 U/L
ALT SERPL-CCNC: 23 U/L
ANION GAP SERPL CALC-SCNC: 11 MMOL/L
AST SERPL-CCNC: 20 U/L
BILIRUB SERPL-MCNC: 0.4 MG/DL
BUN SERPL-MCNC: 17 MG/DL
CALCIUM SERPL-MCNC: 9.5 MG/DL
CHLORIDE SERPL-SCNC: 99 MMOL/L
CHOLEST SERPL-MCNC: 173 MG/DL
CO2 SERPL-SCNC: 27 MMOL/L
CREAT SERPL-MCNC: 0.77 MG/DL
EGFR: 92 ML/MIN/1.73M2
ESTIMATED AVERAGE GLUCOSE: 111 MG/DL
GLUCOSE SERPL-MCNC: 145 MG/DL
HBA1C MFR BLD HPLC: 5.5 %
HDLC SERPL-MCNC: 45 MG/DL
LDLC SERPL CALC-MCNC: 83 MG/DL
NONHDLC SERPL-MCNC: 128 MG/DL
POTASSIUM SERPL-SCNC: 4.3 MMOL/L
PROT SERPL-MCNC: 7.5 G/DL
SODIUM SERPL-SCNC: 137 MMOL/L
T4 FREE SERPL-MCNC: 1.5 NG/DL
TRIGL SERPL-MCNC: 275 MG/DL
TSH SERPL-ACNC: 2.25 UIU/ML

## 2024-03-27 ENCOUNTER — APPOINTMENT (OUTPATIENT)
Dept: INTERNAL MEDICINE | Facility: CLINIC | Age: 54
End: 2024-03-27
Payer: COMMERCIAL

## 2024-03-27 VITALS
DIASTOLIC BLOOD PRESSURE: 83 MMHG | HEIGHT: 67 IN | TEMPERATURE: 97.8 F | SYSTOLIC BLOOD PRESSURE: 131 MMHG | WEIGHT: 224 LBS | BODY MASS INDEX: 35.16 KG/M2 | OXYGEN SATURATION: 100 % | HEART RATE: 75 BPM

## 2024-03-27 DIAGNOSIS — Z87.442 PERSONAL HISTORY OF URINARY CALCULI: ICD-10-CM

## 2024-03-27 DIAGNOSIS — C55 MALIGNANT NEOPLASM OF UTERUS, PART UNSPECIFIED: ICD-10-CM

## 2024-03-27 DIAGNOSIS — Z90.710 ACQUIRED ABSENCE OF BOTH CERVIX AND UTERUS: ICD-10-CM

## 2024-03-27 DIAGNOSIS — E11.9 TYPE 2 DIABETES MELLITUS W/OUT COMPLICATIONS: ICD-10-CM

## 2024-03-27 DIAGNOSIS — D64.9 ANEMIA, UNSPECIFIED: ICD-10-CM

## 2024-03-27 PROCEDURE — G2211 COMPLEX E/M VISIT ADD ON: CPT

## 2024-03-27 PROCEDURE — 99214 OFFICE O/P EST MOD 30 MIN: CPT

## 2024-03-27 RX ORDER — LEVOTHYROXINE SODIUM 125 UG/1
125 TABLET ORAL
Refills: 0 | Status: DISCONTINUED | COMMUNITY
End: 2024-03-27

## 2024-03-27 RX ORDER — TIRZEPATIDE 2.5 MG/.5ML
2.5 INJECTION, SOLUTION SUBCUTANEOUS
Qty: 1 | Refills: 3 | Status: DISCONTINUED | COMMUNITY
Start: 2023-09-12 | End: 2024-03-27

## 2024-03-27 RX ORDER — OLMESARTAN MEDOXOMIL 5 MG/1
5 TABLET, FILM COATED ORAL
Qty: 180 | Refills: 2 | Status: DISCONTINUED | COMMUNITY
Start: 2023-12-29 | End: 2024-03-27

## 2024-03-27 NOTE — HISTORY OF PRESENT ILLNESS
[de-identified] : 53-year-old white female presents for follow-up of hypertension and hyperlipidemia patient denies chest pain shortness of breath fever chills abdominal pain she recently underwent hysterectomy for stage I uterine cancer at [facility] she has been seeing endocrine regularly for diabetes management she states that she is no longer anemic after the hysterectomy she is also seeing urology for nephrolithiasis and has a procedure planned next month

## 2024-03-27 NOTE — PHYSICAL EXAM
[No Acute Distress] : no acute distress [Well Nourished] : well nourished [Well Developed] : well developed [Well-Appearing] : well-appearing [Normal Sclera/Conjunctiva] : normal sclera/conjunctiva [PERRL] : pupils equal round and reactive to light [EOMI] : extraocular movements intact [Normal Outer Ear/Nose] : the outer ears and nose were normal in appearance [Normal Oropharynx] : the oropharynx was normal [No JVD] : no jugular venous distention [No Lymphadenopathy] : no lymphadenopathy [Supple] : supple [Thyroid Normal, No Nodules] : the thyroid was normal and there were no nodules present [No Respiratory Distress] : no respiratory distress  [No Accessory Muscle Use] : no accessory muscle use [Clear to Auscultation] : lungs were clear to auscultation bilaterally [Normal Rate] : normal rate  [Regular Rhythm] : with a regular rhythm [Normal S1, S2] : normal S1 and S2 [No Murmur] : no murmur heard [No Carotid Bruits] : no carotid bruits [No Abdominal Bruit] : a ~M bruit was not heard ~T in the abdomen [No Varicosities] : no varicosities [Pedal Pulses Present] : the pedal pulses are present [No Palpable Aorta] : no palpable aorta [No Edema] : there was no peripheral edema [No Extremity Clubbing/Cyanosis] : no extremity clubbing/cyanosis [Soft] : abdomen soft [Non Tender] : non-tender [Non-distended] : non-distended [No Masses] : no abdominal mass palpated [No HSM] : no HSM [Normal Bowel Sounds] : normal bowel sounds [Normal Posterior Cervical Nodes] : no posterior cervical lymphadenopathy [Normal Anterior Cervical Nodes] : no anterior cervical lymphadenopathy [No CVA Tenderness] : no CVA  tenderness [No Spinal Tenderness] : no spinal tenderness [Grossly Normal Strength/Tone] : grossly normal strength/tone [No Joint Swelling] : no joint swelling [No Rash] : no rash [Coordination Grossly Intact] : coordination grossly intact [No Focal Deficits] : no focal deficits [Normal Gait] : normal gait [Deep Tendon Reflexes (DTR)] : deep tendon reflexes were 2+ and symmetric [Normal Affect] : the affect was normal [Normal Insight/Judgement] : insight and judgment were intact [de-identified] : obese

## 2024-03-27 NOTE — ASSESSMENT
[FreeTextEntry1] : Diet and exercise Blood work done and reviewed Anemia resolved status post hysterectomy Nephrolithiasis follow-up urology Diabetes A1c much improved continue strict diet and exercise Weight loss Hyperlipidemia continue statin blood work reviewed Hypertension blood pressure stable continue metoprolol Follow-up 6 months CCU

## 2024-04-02 ENCOUNTER — RX RENEWAL (OUTPATIENT)
Age: 54
End: 2024-04-02

## 2024-04-02 RX ORDER — ERGOCALCIFEROL 1.25 MG/1
1.25 MG CAPSULE, LIQUID FILLED ORAL
Qty: 13 | Refills: 0 | Status: ACTIVE | COMMUNITY
Start: 2022-02-28 | End: 1900-01-01

## 2024-04-03 RX ORDER — METOPROLOL SUCCINATE 50 MG/1
50 TABLET, EXTENDED RELEASE ORAL DAILY
Qty: 90 | Refills: 3 | Status: ACTIVE | COMMUNITY
Start: 1900-01-01 | End: 1900-01-01

## 2024-04-04 ENCOUNTER — NON-APPOINTMENT (OUTPATIENT)
Age: 54
End: 2024-04-04

## 2024-06-19 ENCOUNTER — APPOINTMENT (OUTPATIENT)
Dept: ENDOCRINOLOGY | Facility: CLINIC | Age: 54
End: 2024-06-19
Payer: COMMERCIAL

## 2024-06-19 VITALS
OXYGEN SATURATION: 100 % | WEIGHT: 242 LBS | BODY MASS INDEX: 37.98 KG/M2 | HEIGHT: 67 IN | TEMPERATURE: 98 F | HEART RATE: 87 BPM | SYSTOLIC BLOOD PRESSURE: 130 MMHG | DIASTOLIC BLOOD PRESSURE: 88 MMHG | RESPIRATION RATE: 16 BRPM

## 2024-06-19 DIAGNOSIS — E66.9 OBESITY, UNSPECIFIED: ICD-10-CM

## 2024-06-19 DIAGNOSIS — R73.03 PREDIABETES.: ICD-10-CM

## 2024-06-19 DIAGNOSIS — E78.5 HYPERLIPIDEMIA, UNSPECIFIED: ICD-10-CM

## 2024-06-19 DIAGNOSIS — E03.9 HYPOTHYROIDISM, UNSPECIFIED: ICD-10-CM

## 2024-06-19 DIAGNOSIS — I10 ESSENTIAL (PRIMARY) HYPERTENSION: ICD-10-CM

## 2024-06-19 LAB — GLUCOSE BLDC GLUCOMTR-MCNC: 178

## 2024-06-19 PROCEDURE — 36415 COLL VENOUS BLD VENIPUNCTURE: CPT

## 2024-06-19 PROCEDURE — 82962 GLUCOSE BLOOD TEST: CPT

## 2024-06-19 PROCEDURE — 99214 OFFICE O/P EST MOD 30 MIN: CPT | Mod: 25

## 2024-06-19 NOTE — HISTORY OF PRESENT ILLNESS
[FreeTextEntry1] : 53 year female  f/u for hypothyroidism management.  *** Jun 19, 2024 ***  feels very well, but regained weight still working with her nutritionist staying on L-thyroxine 100 mcg, Crestor 10 mg HS placed on Flomax and no more kidney stone episodes seeing oncology at [facility]  (s/p  hysterectomy for stage I uterine cancer )  *** Mar 12, 2024 ***  had 2 trips to ER d/t kidney stones saw urologist- started on flomax with some relief working with a nutritionist. so far lost 20 lbs over the past year staying on L-thyroxine  100 mcg, crestor 10 mg HS came off Olmesartan , but stayed on metoprolol today ppg in the office- 187  *** Dec 12, 2023 ***  had a kidney stone in october. also, dx'ed with a uterine cancer s/p lap hysterectomy and BSO at [facility] [place] on 12/06/23 was able to lose 20 lbs prior to the surgery (diet only) mounjaro was not approved no recent labs  *** Sep 12, 2023 ***  had covis a month ago started Wegovy, currently off x 3 weeks d/t shortage. felt nauseous on it lost about 12 lbs while was taking it, regained some past 3 weeks remains on L-thyroxine 112 mcg daily,  Metoprolol ER 50 mg qd, Olmesartan 10 mg qd, crestor 10 mg HS  *** May 04, 2023 ***  dealing with fibroids; used a new medication, that thinks was aggravating her blood pressure was exercising daily, but had to stop b.o worsening bleeding was on keto, but stopped last week. gaining weight staying on L-thyroxine 112 mcg daily,  Metoprolol ER 50 mg qd, Olmesartan 10 mg qd, crestor 10 mg HS did not start fenofibrate yet  *** Nov 03, 2022 ***  started exercising few mos ago, and tore her ACL over the summer. weight is stable from the last visit remains on  L-thyroxine 125 mcg daily,  Metoprolol ER 50 mg qd, Olmesartan 10 mg qd (dose increased) was rx'ed Crestor 10 mg HS, but per patient it was not covered  *** May 23, 2022 ***  feels well overall normal colonoscopy last week wegovy is not covered. diet is better- lost 8 lbs taking L-thyroxine 137 mcg, improved the intake did not start crestor  *** Jan 05, 2022 ***  staying on a modified whole30 diet, myfitness pal. walking daily. lifting weights 3/wk taking L-thyroxine 137 mcg, drisdol 50k qw reports a proper intake  *** Jul 15, 2021 ***  was using whole30 diet, lost 8 lbs so far and regained some b/o lost power in the house. still on L-thyroxine 137 mcg, drisdol 50k qw no recent labs  *** Mar 17, 2021 ***  more consistent with Synthroid intake, but takes ta 7 pm along with other pills and food taking 137 mcg Thyr US (12/15/20)-  no nodules  *** Jul 24, 2020 ***  skips synthroid on average 2-3 times a week diet is worse. still on "whole 30 diet" on synthroid 137 mcg, drisdol 50K qw TSH- 4.4 TG- 362, TC- 244, LDL- 127 a1c- 5.9  *** Televisit  Apr 02, 2020 ***  on synthroid 137 mcg, drisdol 50K qw no recent labs weight is stable, reports 222 lbs  *** Nov 12, 2019 *** no thyr US yet. no rpt labs  feels ok overall. lost 13 lbs on the new diet ("whole 30"- low CHO). seeing Dr. Concepcion (hem-onc) on L-thyroxine 137mcg  *** July 09, 2019 ***   TSH- 1.6, FT4- 1.3 + TPO ab (171), neg tg ab did not do thyr US yet started diet 3 days ago. Lost 2 lbs so far.  HPI: Ms. SHULTZ  was diagnosed with hypothyroidism at the age of 30.  Previously under care of Dr. Alvarado. Most recently on  112 mcg of generic LT4.  She gained about 70 lbs with her pregnancy about 6 years ago, was able to lose 30 lbs after delivery, and slowly regained another 20 lbs. She tries to eat 3 times a day, but is not watching calories for most of the time, frequently eating large carb/calorie dinner. She is very sedentary, putting typically no more than 1000 steps a day. She tried WW twice in her twenty's which helped at that time.  Denies family history of thyroid cancer or history of radiation exposure to head and neck area in a childhood.

## 2024-06-19 NOTE — ASSESSMENT
[Carbohydrate Consistent Diet] : carbohydrate consistent diet [Levothyroxine] : The patient was instructed to take Levothyroxine on an empty stomach, separate from vitamins, and wait at least 30 minutes before eating [FreeTextEntry1] : 1. Hypothyroidism - labs today - L-thyroxine 100 mcg daily, pending results - Proper intake of levothyroxine is reviewed in details, including on an empty stomach, with water only, at least one hour before taking any medications, vitamins, or supplements and three-four hours before taking iron or calcium.  2. Obesity. Borderline diabetes mellitus - Current approaches to weight management are discussed with the patient. - various diets reviewed again., will cont on present since there's an improvement - given her labile blood pressure, would avoid most of the weight loss meds for now - intolerant of wegovy . again advised on Zepbound- at present she wants to continue with her current regimen and focus more on the dietary changes  3. Vitamin D deficiency - drisdol 50K qw  4. Hyperlipidemia - crestor 10 mg HS - might need to resume fenofibrate. add fish oil, to see our RD  RTC 3 months, or sooner prn.

## 2024-06-21 ENCOUNTER — TRANSCRIPTION ENCOUNTER (OUTPATIENT)
Age: 54
End: 2024-06-21

## 2024-06-21 LAB
ALBUMIN SERPL ELPH-MCNC: 4.6 G/DL
ALP BLD-CCNC: 70 U/L
ALT SERPL-CCNC: 28 U/L
ANION GAP SERPL CALC-SCNC: 14 MMOL/L
AST SERPL-CCNC: 26 U/L
BILIRUB SERPL-MCNC: 0.4 MG/DL
BUN SERPL-MCNC: 16 MG/DL
CALCIUM SERPL-MCNC: 9.6 MG/DL
CHLORIDE SERPL-SCNC: 102 MMOL/L
CHOLEST SERPL-MCNC: 181 MG/DL
CO2 SERPL-SCNC: 24 MMOL/L
CREAT SERPL-MCNC: 0.75 MG/DL
EGFR: 95 ML/MIN/1.73M2
ESTIMATED AVERAGE GLUCOSE: 126 MG/DL
FOLATE SERPL-MCNC: 7.5 NG/ML
GLUCOSE SERPL-MCNC: 132 MG/DL
HBA1C MFR BLD HPLC: 6 %
HDLC SERPL-MCNC: 47 MG/DL
LDLC SERPL CALC-MCNC: 78 MG/DL
NONHDLC SERPL-MCNC: 133 MG/DL
POTASSIUM SERPL-SCNC: 4.2 MMOL/L
PROT SERPL-MCNC: 7.5 G/DL
SODIUM SERPL-SCNC: 141 MMOL/L
T4 FREE SERPL-MCNC: 1.1 NG/DL
TRIGL SERPL-MCNC: 347 MG/DL
TSH SERPL-ACNC: 3.37 UIU/ML
VIT B12 SERPL-MCNC: 307 PG/ML

## 2024-06-23 ENCOUNTER — TRANSCRIPTION ENCOUNTER (OUTPATIENT)
Age: 54
End: 2024-06-23

## 2024-07-03 ENCOUNTER — RX RENEWAL (OUTPATIENT)
Age: 54
End: 2024-07-03

## 2024-09-10 ENCOUNTER — LABORATORY RESULT (OUTPATIENT)
Age: 54
End: 2024-09-10

## 2024-09-10 ENCOUNTER — NON-APPOINTMENT (OUTPATIENT)
Age: 54
End: 2024-09-10

## 2024-09-10 ENCOUNTER — APPOINTMENT (OUTPATIENT)
Dept: INTERNAL MEDICINE | Facility: CLINIC | Age: 54
End: 2024-09-10
Payer: COMMERCIAL

## 2024-09-10 VITALS
DIASTOLIC BLOOD PRESSURE: 86 MMHG | SYSTOLIC BLOOD PRESSURE: 136 MMHG | OXYGEN SATURATION: 99 % | WEIGHT: 260 LBS | BODY MASS INDEX: 40.81 KG/M2 | HEIGHT: 67 IN | HEART RATE: 79 BPM

## 2024-09-10 DIAGNOSIS — I10 ESSENTIAL (PRIMARY) HYPERTENSION: ICD-10-CM

## 2024-09-10 DIAGNOSIS — E78.5 HYPERLIPIDEMIA, UNSPECIFIED: ICD-10-CM

## 2024-09-10 DIAGNOSIS — E66.9 OBESITY, UNSPECIFIED: ICD-10-CM

## 2024-09-10 DIAGNOSIS — Z20.828 CONTACT WITH AND (SUSPECTED) EXPOSURE TO OTHER VIRAL COMMUNICABLE DISEASES: ICD-10-CM

## 2024-09-10 DIAGNOSIS — Z00.00 ENCOUNTER FOR GENERAL ADULT MEDICAL EXAMINATION W/OUT ABNORMAL FINDINGS: ICD-10-CM

## 2024-09-10 DIAGNOSIS — C55 MALIGNANT NEOPLASM OF UTERUS, PART UNSPECIFIED: ICD-10-CM

## 2024-09-10 PROCEDURE — 99396 PREV VISIT EST AGE 40-64: CPT

## 2024-09-10 PROCEDURE — 93000 ELECTROCARDIOGRAM COMPLETE: CPT | Mod: 59

## 2024-09-10 PROCEDURE — G0444 DEPRESSION SCREEN ANNUAL: CPT | Mod: 59

## 2024-09-10 NOTE — PHYSICAL EXAM
[No Acute Distress] : no acute distress [Well Nourished] : well nourished [Well Developed] : well developed [Well-Appearing] : well-appearing [Normal Sclera/Conjunctiva] : normal sclera/conjunctiva [PERRL] : pupils equal round and reactive to light [EOMI] : extraocular movements intact [Normal Outer Ear/Nose] : the outer ears and nose were normal in appearance [Normal Oropharynx] : the oropharynx was normal [No JVD] : no jugular venous distention [No Lymphadenopathy] : no lymphadenopathy [Supple] : supple [Thyroid Normal, No Nodules] : the thyroid was normal and there were no nodules present [No Respiratory Distress] : no respiratory distress  [No Accessory Muscle Use] : no accessory muscle use [Clear to Auscultation] : lungs were clear to auscultation bilaterally [Normal Rate] : normal rate  [Regular Rhythm] : with a regular rhythm [Normal S1, S2] : normal S1 and S2 [No Murmur] : no murmur heard [No Carotid Bruits] : no carotid bruits [No Abdominal Bruit] : a ~M bruit was not heard ~T in the abdomen [No Varicosities] : no varicosities [Pedal Pulses Present] : the pedal pulses are present [No Edema] : there was no peripheral edema [No Palpable Aorta] : no palpable aorta [No Extremity Clubbing/Cyanosis] : no extremity clubbing/cyanosis [Soft] : abdomen soft [Non Tender] : non-tender [Non-distended] : non-distended [No Masses] : no abdominal mass palpated [No HSM] : no HSM [Normal Bowel Sounds] : normal bowel sounds [Normal Posterior Cervical Nodes] : no posterior cervical lymphadenopathy [Normal Anterior Cervical Nodes] : no anterior cervical lymphadenopathy [No CVA Tenderness] : no CVA  tenderness [No Spinal Tenderness] : no spinal tenderness [No Joint Swelling] : no joint swelling [Grossly Normal Strength/Tone] : grossly normal strength/tone [No Rash] : no rash [Coordination Grossly Intact] : coordination grossly intact [No Focal Deficits] : no focal deficits [Normal Gait] : normal gait [Normal Affect] : the affect was normal [Normal Insight/Judgement] : insight and judgment were intact [de-identified] : obese

## 2024-09-10 NOTE — HEALTH RISK ASSESSMENT
[No] : No [Never (0 pts)] : Never (0 points) [0] : 2) Feeling down, depressed, or hopeless: Not at all (0) [PHQ-2 Negative - No further assessment needed] : PHQ-2 Negative - No further assessment needed [Never] : Never [NO] : No [Hepatitis C test offered] : Hepatitis C test offered [With Significant Other] : lives with significant other [] :  [de-identified] : 5 min [[id number]] : 0 [Change in mental status noted] : No change in mental status noted [MammogramDate] : 2024 [PapSmearDate] : 2024 [ColonoscopyDate] : 2022

## 2024-09-10 NOTE — HISTORY OF PRESENT ILLNESS
[de-identified] : 53-year-old white female presents for complete checkup patient denies chest pain shortness of breath fever chills abdominal pain she was treated at [place] for early stage uterine cancer and is feeling well she sees endocrine regularly and nutrition for weight loss

## 2024-09-11 LAB
25(OH)D3 SERPL-MCNC: 44.5 NG/ML
ALBUMIN SERPL ELPH-MCNC: 4.5 G/DL
ALP BLD-CCNC: 75 U/L
ALT SERPL-CCNC: 42 U/L
ANION GAP SERPL CALC-SCNC: 13 MMOL/L
APPEARANCE: CLEAR
AST SERPL-CCNC: 46 U/L
BASOPHILS # BLD AUTO: 0.05 K/UL
BASOPHILS NFR BLD AUTO: 0.7 %
BILIRUB SERPL-MCNC: 0.4 MG/DL
BILIRUBIN URINE: NEGATIVE
BLOOD URINE: NEGATIVE
BUN SERPL-MCNC: 18 MG/DL
CALCIUM SERPL-MCNC: 9.5 MG/DL
CHLORIDE SERPL-SCNC: 103 MMOL/L
CHOLEST SERPL-MCNC: 182 MG/DL
CO2 SERPL-SCNC: 26 MMOL/L
COLOR: YELLOW
CREAT SERPL-MCNC: 0.74 MG/DL
EGFR: 97 ML/MIN/1.73M2
EOSINOPHIL # BLD AUTO: 0.17 K/UL
EOSINOPHIL NFR BLD AUTO: 2.4 %
ESTIMATED AVERAGE GLUCOSE: 140 MG/DL
FERRITIN SERPL-MCNC: 55 NG/ML
FOLATE SERPL-MCNC: 9.2 NG/ML
GLUCOSE QUALITATIVE U: NEGATIVE MG/DL
GLUCOSE SERPL-MCNC: 93 MG/DL
HBA1C MFR BLD HPLC: 6.5 %
HCT VFR BLD CALC: 38.6 %
HCV AB SER QL: NONREACTIVE
HCV S/CO RATIO: 0.13 S/CO
HDLC SERPL-MCNC: 50 MG/DL
HGB BLD-MCNC: 12.3 G/DL
IMM GRANULOCYTES NFR BLD AUTO: 0.3 %
IRON SATN MFR SERPL: 18 %
IRON SERPL-MCNC: 70 UG/DL
KETONES URINE: NEGATIVE MG/DL
LDLC SERPL CALC-MCNC: 79 MG/DL
LEUKOCYTE ESTERASE URINE: ABNORMAL
LYMPHOCYTES # BLD AUTO: 2.23 K/UL
LYMPHOCYTES NFR BLD AUTO: 30.9 %
MAN DIFF?: NORMAL
MCHC RBC-ENTMCNC: 30.6 PG
MCHC RBC-ENTMCNC: 31.9 GM/DL
MCV RBC AUTO: 96 FL
MONOCYTES # BLD AUTO: 0.49 K/UL
MONOCYTES NFR BLD AUTO: 6.8 %
NEUTROPHILS # BLD AUTO: 4.26 K/UL
NEUTROPHILS NFR BLD AUTO: 58.9 %
NITRITE URINE: NEGATIVE
NONHDLC SERPL-MCNC: 132 MG/DL
PH URINE: 5.5
PLATELET # BLD AUTO: 279 K/UL
POTASSIUM SERPL-SCNC: 4.5 MMOL/L
PROT SERPL-MCNC: 7.6 G/DL
PROTEIN URINE: NEGATIVE MG/DL
RBC # BLD: 4.02 M/UL
RBC # FLD: 13.6 %
SODIUM SERPL-SCNC: 142 MMOL/L
SPECIFIC GRAVITY URINE: 1.01
T3RU NFR SERPL: 1.2 TBI
T4 SERPL-MCNC: 7.7 UG/DL
TIBC SERPL-MCNC: 386 UG/DL
TRIGL SERPL-MCNC: 329 MG/DL
TSH SERPL-ACNC: 15.4 UIU/ML
UIBC SERPL-MCNC: 317 UG/DL
UROBILINOGEN URINE: 0.2 MG/DL
VIT B12 SERPL-MCNC: 352 PG/ML
WBC # FLD AUTO: 7.22 K/UL

## 2024-09-16 DIAGNOSIS — E03.9 HYPOTHYROIDISM, UNSPECIFIED: ICD-10-CM

## 2024-09-22 RX ORDER — LEVOTHYROXINE SODIUM 0.12 MG/1
125 TABLET ORAL
Qty: 90 | Refills: 1 | Status: ACTIVE | COMMUNITY
Start: 2024-09-16 | End: 1900-01-01

## 2024-09-23 ENCOUNTER — TRANSCRIPTION ENCOUNTER (OUTPATIENT)
Age: 54
End: 2024-09-23

## 2024-10-02 ENCOUNTER — APPOINTMENT (OUTPATIENT)
Dept: ENDOCRINOLOGY | Facility: CLINIC | Age: 54
End: 2024-10-02
Payer: COMMERCIAL

## 2024-10-02 VITALS
BODY MASS INDEX: 40.65 KG/M2 | HEART RATE: 82 BPM | RESPIRATION RATE: 16 BRPM | OXYGEN SATURATION: 99 % | TEMPERATURE: 97.8 F | SYSTOLIC BLOOD PRESSURE: 116 MMHG | HEIGHT: 67 IN | WEIGHT: 259 LBS | DIASTOLIC BLOOD PRESSURE: 80 MMHG

## 2024-10-02 DIAGNOSIS — E03.9 HYPOTHYROIDISM, UNSPECIFIED: ICD-10-CM

## 2024-10-02 DIAGNOSIS — I10 ESSENTIAL (PRIMARY) HYPERTENSION: ICD-10-CM

## 2024-10-02 DIAGNOSIS — E11.9 TYPE 2 DIABETES MELLITUS W/OUT COMPLICATIONS: ICD-10-CM

## 2024-10-02 DIAGNOSIS — E78.5 HYPERLIPIDEMIA, UNSPECIFIED: ICD-10-CM

## 2024-10-02 PROCEDURE — 99214 OFFICE O/P EST MOD 30 MIN: CPT

## 2024-10-02 NOTE — HISTORY OF PRESENT ILLNESS
[FreeTextEntry1] : 53 year female  f/u for hypothyroidism management.  *** Oct 02, 2024 ***  feels better overall, but gained weight labs done 2 weeks ago- TSH- 15.4, a1c- 6.5% L-thyroxine was increased to 125 mcg (started about 5 days ago). stopped taking calcium at the same time  *** Jun 19, 2024 ***  feels very well, but regained weight still working with her nutritionist staying on L-thyroxine 100 mcg, Crestor 10 mg HS placed on Flomax and no more kidney stone episodes seeing oncology at [facility]  (s/p  hysterectomy for stage I uterine cancer )  *** Mar 12, 2024 ***  had 2 trips to ER d/t kidney stones saw urologist- started on flomax with some relief working with a nutritionist. so far lost 20 lbs over the past year staying on L-thyroxine  100 mcg, crestor 10 mg HS came off Olmesartan , but stayed on metoprolol today ppg in the office- 187  *** Dec 12, 2023 ***  had a kidney stone in october. also, dx'ed with a uterine cancer s/p lap hysterectomy and BSO at [facility] on 12/06/23 was able to lose 20 lbs prior to the surgery (diet only) mounjaro was not approved no recent labs  *** Sep 12, 2023 ***  had covis a month ago started Wegovy, currently off x 3 weeks d/t shortage. felt nauseous on it lost about 12 lbs while was taking it, regained some past 3 weeks remains on L-thyroxine 112 mcg daily,  Metoprolol ER 50 mg qd, Olmesartan 10 mg qd, crestor 10 mg HS  *** May 04, 2023 ***  dealing with fibroids; used a new medication, that thinks was aggravating her blood pressure was exercising daily, but had to stop b.o worsening bleeding was on keto, but stopped last week. gaining weight staying on L-thyroxine 112 mcg daily,  Metoprolol ER 50 mg qd, Olmesartan 10 mg qd, crestor 10 mg HS did not start fenofibrate yet  *** Nov 03, 2022 ***  started exercising few mos ago, and tore her ACL over the summer. weight is stable from the last visit remains on  L-thyroxine 125 mcg daily,  Metoprolol ER 50 mg qd, Olmesartan 10 mg qd (dose increased) was rx'ed Crestor 10 mg HS, but per patient it was not covered  *** May 23, 2022 ***  feels well overall normal colonoscopy last week wegovy is not covered. diet is better- lost 8 lbs taking L-thyroxine 137 mcg, improved the intake did not start crestor  *** Jan 05, 2022 ***  staying on a modified whole30 diet, myfitness pal. walking daily. lifting weights 3/wk taking L-thyroxine 137 mcg, drisdol 50k qw reports a proper intake  *** Jul 15, 2021 ***  was using whole30 diet, lost 8 lbs so far and regained some b/o lost power in the house. still on L-thyroxine 137 mcg, drisdol 50k qw no recent labs  *** Mar 17, 2021 ***  more consistent with Synthroid intake, but takes ta 7 pm along with other pills and food taking 137 mcg Thyr US (12/15/20)-  no nodules  *** Jul 24, 2020 ***  skips synthroid on average 2-3 times a week diet is worse. still on "whole 30 diet" on synthroid 137 mcg, drisdol 50K qw TSH- 4.4 TG- 362, TC- 244, LDL- 127 a1c- 5.9  *** Televisit  Apr 02, 2020 ***  on synthroid 137 mcg, drisdol 50K qw no recent labs weight is stable, reports 222 lbs  *** Nov 12, 2019 *** no thyr US yet. no rpt labs  feels ok overall. lost 13 lbs on the new diet ("whole 30"- low CHO). seeing Dr. Concepcion (hem-onc) on L-thyroxine 137mcg  *** July 09, 2019 ***   TSH- 1.6, FT4- 1.3 + TPO ab (171), neg tg ab did not do thyr US yet started diet 3 days ago. Lost 2 lbs so far.  HPI: Ms. SHULTZ  was diagnosed with hypothyroidism at the age of 30.  Previously under care of Dr. Alvarado. Most recently on  112 mcg of generic LT4.  She gained about 70 lbs with her pregnancy about 6 years ago, was able to lose 30 lbs after delivery, and slowly regained another 20 lbs. She tries to eat 3 times a day, but is not watching calories for most of the time, frequently eating large carb/calorie dinner. She is very sedentary, putting typically no more than 1000 steps a day. She tried WW twice in her twenty's which helped at that time.  Denies family history of thyroid cancer or history of radiation exposure to head and neck area in a childhood.

## 2024-10-02 NOTE — ASSESSMENT
[Carbohydrate Consistent Diet] : carbohydrate consistent diet [Levothyroxine] : The patient was instructed to take Levothyroxine on an empty stomach, separate from vitamins, and wait at least 30 minutes before eating [FreeTextEntry1] : 1. Hypothyroidism - labs today - L-thyroxine 100 mcg daily, pending results - Proper intake of levothyroxine is reviewed in details, including on an empty stomach, with water only, at least one hour before taking any medications, vitamins, or supplements and three-four hours before taking iron or calcium.  2. Obesity. T2DM - given her labile blood pressure, would avoid most of the weight loss meds for now - intolerant of wegovy . again, advised on Negrita, R+B. she's willing to try. She reports a history of intolerance of Metformin about 10 years ago (GI distress). We reviewed a potential use of SGLT2i  3. Vitamin D deficiency - drisdol 50K qw  4. Hyperlipidemia - crestor 10 mg HS - might need to resume fenofibrate. add fish oil, to see our RD  RTC 2-3 months, or sooner prn.

## 2024-10-08 ENCOUNTER — RX RENEWAL (OUTPATIENT)
Age: 54
End: 2024-10-08

## 2024-10-08 RX ORDER — TIRZEPATIDE 2.5 MG/.5ML
2.5 INJECTION, SOLUTION SUBCUTANEOUS
Qty: 4 | Refills: 4 | Status: ACTIVE | COMMUNITY
Start: 2024-10-02

## 2024-11-20 ENCOUNTER — TRANSCRIPTION ENCOUNTER (OUTPATIENT)
Age: 54
End: 2024-11-20

## 2024-12-18 ENCOUNTER — TRANSCRIPTION ENCOUNTER (OUTPATIENT)
Age: 54
End: 2024-12-18

## 2024-12-18 RX ORDER — ORAL SEMAGLUTIDE 3 MG/1
3 TABLET ORAL
Qty: 30 | Refills: 0 | Status: ACTIVE | COMMUNITY
Start: 2024-12-18 | End: 1900-01-01

## 2025-01-30 ENCOUNTER — APPOINTMENT (OUTPATIENT)
Dept: ENDOCRINOLOGY | Facility: CLINIC | Age: 55
End: 2025-01-30

## 2025-01-30 ENCOUNTER — APPOINTMENT (OUTPATIENT)
Dept: ENDOCRINOLOGY | Facility: CLINIC | Age: 55
End: 2025-01-30
Payer: COMMERCIAL

## 2025-01-30 VITALS
DIASTOLIC BLOOD PRESSURE: 89 MMHG | OXYGEN SATURATION: 98 % | HEIGHT: 67 IN | SYSTOLIC BLOOD PRESSURE: 137 MMHG | HEART RATE: 83 BPM | RESPIRATION RATE: 16 BRPM

## 2025-01-30 DIAGNOSIS — I10 ESSENTIAL (PRIMARY) HYPERTENSION: ICD-10-CM

## 2025-01-30 DIAGNOSIS — E78.5 HYPERLIPIDEMIA, UNSPECIFIED: ICD-10-CM

## 2025-01-30 DIAGNOSIS — E11.9 TYPE 2 DIABETES MELLITUS W/OUT COMPLICATIONS: ICD-10-CM

## 2025-01-30 DIAGNOSIS — E66.9 OBESITY, UNSPECIFIED: ICD-10-CM

## 2025-01-30 DIAGNOSIS — E03.9 HYPOTHYROIDISM, UNSPECIFIED: ICD-10-CM

## 2025-01-30 LAB — GLUCOSE BLDC GLUCOMTR-MCNC: 123

## 2025-01-30 PROCEDURE — 82962 GLUCOSE BLOOD TEST: CPT

## 2025-01-30 PROCEDURE — 99214 OFFICE O/P EST MOD 30 MIN: CPT

## 2025-01-30 PROCEDURE — 99215 OFFICE O/P EST HI 40 MIN: CPT

## 2025-01-30 RX ORDER — EMPAGLIFLOZIN 10 MG/1
10 TABLET, FILM COATED ORAL
Qty: 30 | Refills: 6 | Status: ACTIVE | COMMUNITY
Start: 2025-01-30 | End: 1900-01-01

## 2025-01-31 LAB
25(OH)D3 SERPL-MCNC: 53.5 NG/ML
ALBUMIN SERPL ELPH-MCNC: 4.5 G/DL
ALP BLD-CCNC: 81 U/L
ALT SERPL-CCNC: 62 U/L
ANION GAP SERPL CALC-SCNC: 15 MMOL/L
AST SERPL-CCNC: 89 U/L
BASOPHILS # BLD AUTO: 0.04 K/UL
BASOPHILS NFR BLD AUTO: 0.5 %
BILIRUB SERPL-MCNC: 0.5 MG/DL
BUN SERPL-MCNC: 14 MG/DL
CALCIUM SERPL-MCNC: 9.7 MG/DL
CHLORIDE SERPL-SCNC: 99 MMOL/L
CHOLEST SERPL-MCNC: 177 MG/DL
CO2 SERPL-SCNC: 25 MMOL/L
CREAT SERPL-MCNC: 0.6 MG/DL
CREAT SPEC-SCNC: 74 MG/DL
EGFR: 107 ML/MIN/1.73M2
EOSINOPHIL # BLD AUTO: 0.24 K/UL
EOSINOPHIL NFR BLD AUTO: 3 %
ESTIMATED AVERAGE GLUCOSE: 160 MG/DL
FOLATE SERPL-MCNC: 9 NG/ML
FRUCTOSAMINE SERPL-MCNC: 263 UMOL/L
GLUCOSE SERPL-MCNC: 110 MG/DL
GLYCOMARK.: 17.1 UG/ML
HBA1C MFR BLD HPLC: 7.2 %
HCT VFR BLD CALC: 39.3 %
HDLC SERPL-MCNC: 53 MG/DL
HGB BLD-MCNC: 12.5 G/DL
IMM GRANULOCYTES NFR BLD AUTO: 0.4 %
LDLC SERPL CALC-MCNC: 89 MG/DL
LYMPHOCYTES # BLD AUTO: 2.59 K/UL
LYMPHOCYTES NFR BLD AUTO: 32.2 %
MAN DIFF?: NORMAL
MCHC RBC-ENTMCNC: 30.9 PG
MCHC RBC-ENTMCNC: 31.8 G/DL
MCV RBC AUTO: 97 FL
MICROALBUMIN 24H UR DL<=1MG/L-MCNC: <1.2 MG/DL
MICROALBUMIN/CREAT 24H UR-RTO: NORMAL MG/G
MONOCYTES # BLD AUTO: 0.45 K/UL
MONOCYTES NFR BLD AUTO: 5.6 %
NEUTROPHILS # BLD AUTO: 4.69 K/UL
NEUTROPHILS NFR BLD AUTO: 58.3 %
NONHDLC SERPL-MCNC: 124 MG/DL
PLATELET # BLD AUTO: 313 K/UL
POTASSIUM SERPL-SCNC: 4.8 MMOL/L
PROT SERPL-MCNC: 7.7 G/DL
RBC # BLD: 4.05 M/UL
RBC # FLD: 13.4 %
SODIUM SERPL-SCNC: 139 MMOL/L
T4 FREE SERPL-MCNC: 1.3 NG/DL
TRIGL SERPL-MCNC: 210 MG/DL
TSH SERPL-ACNC: 10 UIU/ML
VIT B12 SERPL-MCNC: 442 PG/ML
WBC # FLD AUTO: 8.04 K/UL

## 2025-02-03 ENCOUNTER — RX RENEWAL (OUTPATIENT)
Age: 55
End: 2025-02-03

## 2025-02-12 ENCOUNTER — NON-APPOINTMENT (OUTPATIENT)
Age: 55
End: 2025-02-12

## 2025-02-18 ENCOUNTER — APPOINTMENT (OUTPATIENT)
Dept: ENDOCRINOLOGY | Facility: CLINIC | Age: 55
End: 2025-02-18
Payer: COMMERCIAL

## 2025-02-18 DIAGNOSIS — E78.5 HYPERLIPIDEMIA, UNSPECIFIED: ICD-10-CM

## 2025-02-18 DIAGNOSIS — E04.2 NONTOXIC MULTINODULAR GOITER: ICD-10-CM

## 2025-02-18 DIAGNOSIS — E03.9 HYPOTHYROIDISM, UNSPECIFIED: ICD-10-CM

## 2025-02-18 DIAGNOSIS — E66.9 OBESITY, UNSPECIFIED: ICD-10-CM

## 2025-02-18 DIAGNOSIS — E11.65 TYPE 2 DIABETES MELLITUS WITH HYPERGLYCEMIA: ICD-10-CM

## 2025-02-18 DIAGNOSIS — I10 ESSENTIAL (PRIMARY) HYPERTENSION: ICD-10-CM

## 2025-02-18 PROCEDURE — 99214 OFFICE O/P EST MOD 30 MIN: CPT | Mod: 95

## 2025-02-18 RX ORDER — TIRZEPATIDE 2.5 MG/.5ML
2.5 INJECTION, SOLUTION SUBCUTANEOUS
Qty: 1 | Refills: 6 | Status: ACTIVE | COMMUNITY
Start: 2025-02-18 | End: 1900-01-01

## 2025-03-18 ENCOUNTER — APPOINTMENT (OUTPATIENT)
Dept: INTERNAL MEDICINE | Facility: CLINIC | Age: 55
End: 2025-03-18
Payer: COMMERCIAL

## 2025-03-18 VITALS
OXYGEN SATURATION: 100 % | HEART RATE: 78 BPM | WEIGHT: 271 LBS | BODY MASS INDEX: 42.53 KG/M2 | SYSTOLIC BLOOD PRESSURE: 116 MMHG | HEIGHT: 67 IN | DIASTOLIC BLOOD PRESSURE: 80 MMHG

## 2025-03-18 DIAGNOSIS — E03.9 HYPOTHYROIDISM, UNSPECIFIED: ICD-10-CM

## 2025-03-18 DIAGNOSIS — E66.9 OBESITY, UNSPECIFIED: ICD-10-CM

## 2025-03-18 DIAGNOSIS — R73.03 PREDIABETES.: ICD-10-CM

## 2025-03-18 DIAGNOSIS — E11.9 TYPE 2 DIABETES MELLITUS W/OUT COMPLICATIONS: ICD-10-CM

## 2025-03-18 PROCEDURE — 99213 OFFICE O/P EST LOW 20 MIN: CPT

## 2025-03-18 PROCEDURE — G2211 COMPLEX E/M VISIT ADD ON: CPT | Mod: NC

## 2025-03-25 ENCOUNTER — RX RENEWAL (OUTPATIENT)
Age: 55
End: 2025-03-25

## 2025-05-01 ENCOUNTER — APPOINTMENT (OUTPATIENT)
Dept: ENDOCRINOLOGY | Facility: CLINIC | Age: 55
End: 2025-05-01
Payer: COMMERCIAL

## 2025-05-01 VITALS
OXYGEN SATURATION: 99 % | BODY MASS INDEX: 40.34 KG/M2 | HEART RATE: 76 BPM | RESPIRATION RATE: 16 BRPM | HEIGHT: 67 IN | WEIGHT: 257 LBS | SYSTOLIC BLOOD PRESSURE: 110 MMHG | DIASTOLIC BLOOD PRESSURE: 74 MMHG

## 2025-05-01 DIAGNOSIS — E66.9 OBESITY, UNSPECIFIED: ICD-10-CM

## 2025-05-01 DIAGNOSIS — E11.65 TYPE 2 DIABETES MELLITUS WITH HYPERGLYCEMIA: ICD-10-CM

## 2025-05-01 DIAGNOSIS — E03.9 HYPOTHYROIDISM, UNSPECIFIED: ICD-10-CM

## 2025-05-01 DIAGNOSIS — E04.2 NONTOXIC MULTINODULAR GOITER: ICD-10-CM

## 2025-05-01 DIAGNOSIS — I10 ESSENTIAL (PRIMARY) HYPERTENSION: ICD-10-CM

## 2025-05-01 DIAGNOSIS — E78.5 HYPERLIPIDEMIA, UNSPECIFIED: ICD-10-CM

## 2025-05-01 LAB — GLUCOSE BLDC GLUCOMTR-MCNC: 97

## 2025-05-01 PROCEDURE — 99214 OFFICE O/P EST MOD 30 MIN: CPT

## 2025-05-01 PROCEDURE — 36415 COLL VENOUS BLD VENIPUNCTURE: CPT

## 2025-05-01 PROCEDURE — 82962 GLUCOSE BLOOD TEST: CPT

## 2025-05-02 LAB
ALBUMIN SERPL ELPH-MCNC: 4.2 G/DL
ALP BLD-CCNC: 58 U/L
ALT SERPL-CCNC: 46 U/L
ANION GAP SERPL CALC-SCNC: 14 MMOL/L
AST SERPL-CCNC: 39 U/L
BILIRUB SERPL-MCNC: 0.5 MG/DL
BUN SERPL-MCNC: 16 MG/DL
CALCIUM SERPL-MCNC: 9.3 MG/DL
CHLORIDE SERPL-SCNC: 106 MMOL/L
CHOLEST SERPL-MCNC: 254 MG/DL
CO2 SERPL-SCNC: 21 MMOL/L
CREAT SERPL-MCNC: 0.8 MG/DL
CREAT SPEC-SCNC: 86 MG/DL
EGFRCR SERPLBLD CKD-EPI 2021: 88 ML/MIN/1.73M2
ESTIMATED AVERAGE GLUCOSE: 140 MG/DL
FRUCTOSAMINE SERPL-MCNC: 203 UMOL/L
GLUCOSE SERPL-MCNC: 103 MG/DL
HBA1C MFR BLD HPLC: 6.5 %
HDLC SERPL-MCNC: 43 MG/DL
LDLC SERPL-MCNC: 166 MG/DL
MICROALBUMIN 24H UR DL<=1MG/L-MCNC: <1.2 MG/DL
MICROALBUMIN/CREAT 24H UR-RTO: NORMAL MG/G
NONHDLC SERPL-MCNC: 211 MG/DL
POTASSIUM SERPL-SCNC: 4.9 MMOL/L
PROT SERPL-MCNC: 7.2 G/DL
SODIUM SERPL-SCNC: 141 MMOL/L
T4 FREE SERPL-MCNC: 1.6 NG/DL
TRIGL SERPL-MCNC: 241 MG/DL
TSH SERPL-ACNC: 0.63 UIU/ML

## 2025-05-02 RX ORDER — ATORVASTATIN CALCIUM 10 MG/1
10 TABLET, FILM COATED ORAL
Qty: 90 | Refills: 3 | Status: ACTIVE | COMMUNITY
Start: 2025-05-02 | End: 1900-01-01

## 2025-07-26 ENCOUNTER — RX RENEWAL (OUTPATIENT)
Age: 55
End: 2025-07-26

## 2025-08-11 ENCOUNTER — RX RENEWAL (OUTPATIENT)
Age: 55
End: 2025-08-11

## 2025-08-18 ENCOUNTER — LABORATORY RESULT (OUTPATIENT)
Age: 55
End: 2025-08-18

## 2025-08-18 ENCOUNTER — APPOINTMENT (OUTPATIENT)
Dept: ENDOCRINOLOGY | Facility: CLINIC | Age: 55
End: 2025-08-18
Payer: COMMERCIAL

## 2025-08-18 VITALS
WEIGHT: 254 LBS | BODY MASS INDEX: 39.87 KG/M2 | SYSTOLIC BLOOD PRESSURE: 110 MMHG | HEART RATE: 82 BPM | RESPIRATION RATE: 16 BRPM | DIASTOLIC BLOOD PRESSURE: 74 MMHG | OXYGEN SATURATION: 99 % | HEIGHT: 67 IN

## 2025-08-18 DIAGNOSIS — E66.9 OBESITY, UNSPECIFIED: ICD-10-CM

## 2025-08-18 DIAGNOSIS — E04.2 NONTOXIC MULTINODULAR GOITER: ICD-10-CM

## 2025-08-18 DIAGNOSIS — I10 ESSENTIAL (PRIMARY) HYPERTENSION: ICD-10-CM

## 2025-08-18 DIAGNOSIS — E78.5 HYPERLIPIDEMIA, UNSPECIFIED: ICD-10-CM

## 2025-08-18 DIAGNOSIS — E11.9 TYPE 2 DIABETES MELLITUS W/OUT COMPLICATIONS: ICD-10-CM

## 2025-08-18 DIAGNOSIS — E03.9 HYPOTHYROIDISM, UNSPECIFIED: ICD-10-CM

## 2025-08-18 LAB — GLUCOSE BLDC GLUCOMTR-MCNC: 101

## 2025-08-18 PROCEDURE — 99214 OFFICE O/P EST MOD 30 MIN: CPT | Mod: 25

## 2025-08-18 PROCEDURE — 36415 COLL VENOUS BLD VENIPUNCTURE: CPT

## 2025-08-18 PROCEDURE — 82962 GLUCOSE BLOOD TEST: CPT

## 2025-08-26 ENCOUNTER — TRANSCRIPTION ENCOUNTER (OUTPATIENT)
Age: 55
End: 2025-08-26

## 2025-08-26 LAB
ALBUMIN SERPL ELPH-MCNC: 4.4 G/DL
ALBUMIN, RANDOM URINE: <1.2 MG/DL
ALP BLD-CCNC: 76 U/L
ALT SERPL-CCNC: 27 U/L
ANION GAP SERPL CALC-SCNC: 16 MMOL/L
AST SERPL-CCNC: 40 U/L
BILIRUB SERPL-MCNC: 0.4 MG/DL
BUN SERPL-MCNC: 15 MG/DL
CALCIUM SERPL-MCNC: 9.8 MG/DL
CHLORIDE SERPL-SCNC: 105 MMOL/L
CHOLEST SERPL-MCNC: 188 MG/DL
CO2 SERPL-SCNC: 18 MMOL/L
CREAT SERPL-MCNC: 0.69 MG/DL
CREAT SPEC-SCNC: 104 MG/DL
EGFRCR SERPLBLD CKD-EPI 2021: 103 ML/MIN/1.73M2
ESTIMATED AVERAGE GLUCOSE: 117 MG/DL
FOLATE SERPL-MCNC: 7.3 NG/ML
FRUCTOSAMINE SERPL-MCNC: 274 UMOL/L
GLUCOSE SERPL-MCNC: 103 MG/DL
HBA1C MFR BLD HPLC: 5.7 %
HDLC SERPL-MCNC: 44 MG/DL
LDLC SERPL-MCNC: 99 MG/DL
MICROALBUMIN/CREAT 24H UR-RTO: NORMAL MG/G
NONHDLC SERPL-MCNC: 143 MG/DL
POTASSIUM SERPL-SCNC: 5.1 MMOL/L
PROT SERPL-MCNC: 7.9 G/DL
SODIUM SERPL-SCNC: 139 MMOL/L
TRIGL SERPL-MCNC: 260 MG/DL
TSH SERPL-ACNC: 0.08 UIU/ML
VIT B12 SERPL-MCNC: 284 PG/ML

## 2025-09-09 ENCOUNTER — TRANSCRIPTION ENCOUNTER (OUTPATIENT)
Age: 55
End: 2025-09-09

## 2025-09-11 ENCOUNTER — RX RENEWAL (OUTPATIENT)
Age: 55
End: 2025-09-11

## 2025-09-12 ENCOUNTER — TRANSCRIPTION ENCOUNTER (OUTPATIENT)
Age: 55
End: 2025-09-12

## 2025-09-15 ENCOUNTER — APPOINTMENT (OUTPATIENT)
Dept: INTERNAL MEDICINE | Facility: CLINIC | Age: 55
End: 2025-09-15
Payer: COMMERCIAL

## 2025-09-15 VITALS
WEIGHT: 253 LBS | OXYGEN SATURATION: 100 % | BODY MASS INDEX: 39.71 KG/M2 | HEART RATE: 79 BPM | SYSTOLIC BLOOD PRESSURE: 101 MMHG | DIASTOLIC BLOOD PRESSURE: 65 MMHG | HEIGHT: 67 IN

## 2025-09-15 DIAGNOSIS — E04.2 NONTOXIC MULTINODULAR GOITER: ICD-10-CM

## 2025-09-15 DIAGNOSIS — I10 ESSENTIAL (PRIMARY) HYPERTENSION: ICD-10-CM

## 2025-09-15 DIAGNOSIS — E78.5 HYPERLIPIDEMIA, UNSPECIFIED: ICD-10-CM

## 2025-09-15 DIAGNOSIS — Z00.00 ENCOUNTER FOR GENERAL ADULT MEDICAL EXAMINATION W/OUT ABNORMAL FINDINGS: ICD-10-CM

## 2025-09-15 PROCEDURE — 93000 ELECTROCARDIOGRAM COMPLETE: CPT

## 2025-09-15 PROCEDURE — 99396 PREV VISIT EST AGE 40-64: CPT
